# Patient Record
Sex: MALE | Race: WHITE | Employment: FULL TIME | URBAN - METROPOLITAN AREA
[De-identification: names, ages, dates, MRNs, and addresses within clinical notes are randomized per-mention and may not be internally consistent; named-entity substitution may affect disease eponyms.]

---

## 2019-12-18 ENCOUNTER — HOSPITAL ENCOUNTER (OUTPATIENT)
Dept: PHYSICAL THERAPY | Age: 59
Discharge: HOME | End: 2019-12-18
Payer: COMMERCIAL

## 2019-12-18 PROCEDURE — 97162 PT EVAL MOD COMPLEX 30 MIN: CPT

## 2019-12-18 NOTE — THERAPY EVALUATION
Anaid Seymour  : 1960  Primary: [place] [facility]*  Secondary:  Therapy Center at [facility] JESSI SHOREA  [address] [facility], [place], [address] [place] [address],[address], [place] [address].  [phone]   [phone]        OUTPATIENT PHYSICAL THERAPY:Initial Assessment 2019   ICD-10: Treatment Diagnosis: Cervalgia (M54.2),Pain in right elbow (M25.521), Pain in Thoracic spine (M54.6)  Precautions/Allergies: none/Erythromycin and Lortab [hydrocodone-acetaminophen]   TREATMENT PLAN:  Effective Dates: 2019 TO 2020 (60 days). Frequency/Duration: 1 to 3 times a week for 60 Day(s) MEDICAL/REFERRING DIAGNOSIS:  pt neck and back pain, right arm pain  DATE OF ONSET:   REFERRING PHYSICIAN: Darryle Duhamel.MD BAEZA Orders: Evaluate and treat. Neck and right arm pain. Traction if patient requests  Return MD Appointment: unsure     INITIAL ASSESSMENT:  Mr. Hali Haddad comes to therapy with complaints of mid back, neck and right arm pain that becomes severe with \"random movements\". He denies headache, numbness or tingling. No x-rays. Did have an injection in the right mid trap region for muscle spasm without relief. He presents with tearful affect due to amount of pain. Sits in tripod position occasionally using his hand to hold up his head or in a reclined position with the right hand behind his head for pain relief. All Right arm movements are guarded. Head and trunk movements rigid and minimal. Unable to fully assess RUE or cervical AROM. Unable to fully assess strength due to pain with testing. Unclear if he had a positive R quadrant testing due to cramping in the shoulder region. He needs to be able to lift, push and pull for work at [employer]. He will benefit from skilled therapy to address his deficits and assist in the return of functional ADL's with less pain. PROBLEM LIST (Impacting functional limitations):  1. Decreased Strength  2.  Decreased ADL/Functional Activities  3. Increased Pain  4. Decreased Activity Tolerance  5. Increased Fatigue INTERVENTIONS PLANNED: (Treatment may consist of any combination of the following)  1. Cold  2. Electrical Stimulation  3. Heat  4. Home Exercise Program (HEP)  5. Manual Therapy  6. Neuromuscular Re-education/Strengthening  7. Range of Motion (ROM)  8. Therapeutic Exercise/Strengthening   9. Mechanical traction     GOALS: (Goals have been discussed and agreed upon with patient.)  Short-Term Functional Goals: Time Frame: 2 weeks     1. Independent with HEP     2. Neck and R shoulder AROM WNL to allow ADL's without compensation  3. Pt demonstrate good posture  4. Pt to demonstrate pain relieving position  Discharge Goals: Time Frame: 60 days     1. Tolerate return to full work activity > 45 minutes      2. ADL's with pain < 3/10  OUTCOME MEASURE:   Tool Used: Neck Disability Index (NDI)  Score:  Initial: 19/50  Most Recent: X/50 (Date: -- )   Interpretation of Score: The Neck Disability Index is a revised form of the Oswestry Low Back Pain Index and is designed to measure the activities of daily living in person's with neck pain. Each section is scored on a 0-5 scale, 5 representing the greatest disability. The scores of each section are added together for a total score of 50. MEDICAL NECESSITY:   · Skilled intervention continues to be required due to need for manual treatment, modalities for pain and guided prgression into exercises. REASON FOR SERVICES/OTHER COMMENTS:  · Patient continues to require skilled intervention due to need to return to full work duty. Total Duration:  PT Patient Time In/Time Out  Time In: 1345  Time Out: 1445    Rehabilitation Potential For Stated Goals: Good  Regarding Danny Parada's therapy, I certify that the treatment plan above will be carried out by a therapist or under their direction.   Thank you for this referral,  Wood Etienne, PT     Referring Physician Signature: Jeffry Matson Octaviano Cyr MD No Signature is Required for this note. PAIN/SUBJECTIVE:   Initial:   10/10 Post Session:  1 to 5/10   HISTORY:   History of Injury/Illness (Reason for Referral):  Pt reports increasing right forearm pain while weighing turkeys before Thanks giving. Pain increased as he tried to stay active at work that week. Pain has gradually become so severe in the right arm, right shoulder, neck and mid back that has not been able to sleep, drive or work. Had an injection in the mid back muscle with only minimal short term relief. Denies numbness or tingling but has difficulty using right fingers. Has tried a home traction unit without relief. Past Medical History/Comorbidities:   Mr. Saravanan Blevins  has a past medical history of Arthritis, testicular Cancer, neck pain 2011  Mr. Saravanan Blevins  has a past surgical history that includes hx orchiectomy. Social History/Living Environment:     lives with his wife who is a RN. 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 Works at [employer] having to [employer]  Previous Treatment Approaches:          PT for neck 2009 and 2011   Ambulatory/Rehab Services H2 Model Falls Risk Assessment   Risk Factors:       (1)  Gender [Male] Ability to Rise from Chair:       (0)  Ability to rise in a single movement   Falls Prevention Plan:       No modifications necessary   Total: (5 or greater = High Risk): 1   ©2010 [facility] [place] [address] [place] [address] Patent #[id number]. Federal Law prohibits the replication, distribution or use without written permission from [facility] of [employer]   Current Medications:     Tramadol  PRN Advil or tylenol    Date Last Reviewed:  12/18/19   Number of Personal Factors/Comorbidities that affect the Plan of Care: 1-2: MODERATE COMPLEXITY   EXAMINATION:   Observation/Orthostatic Postural Assessment:          Pt grimacing with all neck and UE movement.  Tripod position with forward head and rounded shoulders with rigid neck and trunk. Pt using his hands to hold his head. Tearful about the pain  Palpation:          Guarded movements during attempts to move the right arm or turn his head. Generalized tenderness over the right mid and lower trap muscles, right levator scapularis, C5-T2  ROM: cervical AROM~ 25% due to pain. RUE WNL except:                      RUE AROM  R Shoulder Horizontal ABduction: (to neutral per pt preference due to pain in mid back)  R Shoulder Internal Rotation: (to L4)                 Strength: RUE 5/5 except:       RUE Strength  R Shoulder Flexion: 4  R Shoulder Extension: 4  R Shoulder ABduction: 4-  R Shoulder Horizontal ABduction: 4-  R Shoulder Internal Rotation: 4(subscap 3+)  R Shoulder External Rotation: 4              Special Tests:          Positive Right quadrant   Body Structures Involved:  1. Nerves  2. Joints  3. Muscles  4. Ligaments Body Functions Affected:  1. Sensory/Pain  2. Neuromusculoskeletal  3. Movement Related Activities and Participation Affected:  1. General Tasks and Demands  2. Self Care  3.  Domestic Life  4. job duties   Number of elements (examined above) that affect the Plan of Care: 3: MODERATE COMPLEXITY   CLINICAL PRESENTATION:   Presentation: Evolving clinical presentation with changing clinical characteristics: MODERATE COMPLEXITY   CLINICAL DECISION MAKING:   Use of outcome tool(s) and clinical judgement create a POC that gives a: Questionable prediction of patient's progress: MODERATE COMPLEXITY

## 2019-12-18 NOTE — PROGRESS NOTES
Eulalio Matthews  : 1960  Payor: Helioaide Estrada / [place] OF [address] [place] [address] Rd / Product Type: PPO /  Therapy Center at [address] [place] [address] Rd  [address] [facility], [address], [place] [address].  [phone]   [phone]       OUTPATIENT PHYSICAL THERAPY: Daily Treatment Note 2019  Visit Count: 1     ICD-10: Treatment Diagnosis:  Jonah Persons (M54.2),Pain in right elbow (M25.521), Pain in Thoracic spine (M54.6)    Precautions/Allergies: none/Erythromycin and Lortab [hydrocodone-acetaminophen]   TREATMENT PLAN:  Effective Dates: 2019 TO 2020 (60 days). Frequency/Duration: 1 to 3 times a week for 60 Day(s) MEDICAL/REFERRING DIAGNOSIS:  pt neck, right shoulder and mid back pain  DATE OF ONSET:   REFERRING PHYSICIAN: Sravan Schmidt MD MD Orders: Evaluate and treat. Neck and right arm pain. Traction if patient requests  Return MD Appointment: unsure       Pre-treatment Symptoms/Complaints:  Pt complains of constant ache pain that becomes severe in the right forearm/elbow region, neck and right mid back with \"just random\" positions    Pain: Initial:   10/10 Post Session:  -5/10   Medications Last Reviewed:  19    Updated Objective Findings:   Observation: Pt grimacing with all neck and UE movement. Tripod position with forward head and rounded shoulders with rigid neck and trunk. Pt using his hands to hold his head. Tearful about the pain       TREATMENT:   Therapeutic Exercise: (5 Minutes):  Exercises  to improve mobility and posture. Required maximal verbal, manual and demonstration cues to promote proper body alignment, promote proper body posture, promote proper body breathing techniques and technique. Home program with gentle chin tuck with scapular retraction, B shoulder ER, hooklying LTR.     Manual treatment: ( 5 minutes) manual cervical traction with PROM B rotation and left side bend; upper cervical flexion  Treatment/Session Summary: · Response to Treatment:  Good pain relief with manual treatment  · Communication/Consultation:  None today  · Equipment provided today:  None today  · Recommendations/Intent for next treatment session: Next visit will focus on manual treatment, pain modalities, traction if appropriate. Review HEP    Total Treatment Billable Duration:  10 minutes with evaluation  PT Patient Time In/Time Out  Time In: 1345  Time Out: [address], PT    No future appointments.

## 2020-01-02 ENCOUNTER — HOSPITAL ENCOUNTER (OUTPATIENT)
Dept: PHYSICAL THERAPY | Age: 60
Discharge: HOME | End: 2020-01-02
Payer: COMMERCIAL

## 2020-01-02 PROCEDURE — 97140 MANUAL THERAPY 1/> REGIONS: CPT

## 2020-01-02 NOTE — PROGRESS NOTES
Gloris Goldberg  : 1960  Payor: Андрей Loges / [place] [address] [place] [address] Rd / Product Type: PPO /  Therapy Center at [facility] JESSI TURNER  1101 [facility], [address], [address] [facility]  [phone]   [phone]       OUTPATIENT PHYSICAL THERAPY: Daily Treatment Note 2020  Visit Count: 2     ICD-10: Treatment Diagnosis:  Tai Carvajal (M54.2),Pain in right elbow (M25.521), Pain in Thoracic spine (M54.6)    Precautions/Allergies: none/Erythromycin and Lortab [hydrocodone-acetaminophen]   TREATMENT PLAN:  Effective Dates: 2019 TO 2020 (60 days). Frequency/Duration: 1 to 3 times a week for 60 Day(s) MEDICAL/REFERRING DIAGNOSIS:  pt neck, right shoulder and mid back pain  DATE OF ONSET:   REFERRING PHYSICIAN: Arnold Dangelo.MD BAEZA Orders: Evaluate and treat. Neck and right arm pain. Traction if patient requests  Return MD Appointment: unsure       Pre-treatment Symptoms/Complaints:  Pt reports he did okay the day of his evaluation, but has been sore ever since. The doctor who ordered PT is a friend of his who is out of town until next week, but patient may contact him next week to see about an MRI. Notes that he has a Guido traction unit at home, but it hasn't seemed to help. Reports \"numbness\" in R 4th and 5th fingers. Pain: Initial: Pain Intensity 1: 3   Post Session:  \"Better\"   Medications Last Reviewed:  20    Updated Objective Findings:   Notes pain with ulnar nerve glides. TREATMENT:   Therapeutic Exercise: 2 minutes  ( ):Instructed patient in standing with back to door for periodic posture checks, and instructed patient in performance of scapular retraction. .    Manual treatment: ( 25 minutes) manual cervical traction x 15 minutes with patient supine on table. 10 Minutes STM to cervical paraspinals and R upper trap. L cervical sidebend with depression of R shoulder, in supine. Gentle nerve glides.    Treatment/Session Summary: · Response to Treatment:  Good pain relief with treatment today. He returns to PT tomorrow and is to report how long the relief lasted. · Communication/Consultation:  None today  · Equipment provided today:  None today  · Recommendations/Intent for next treatment session: Next visit will focus on manual treatment, and pain modalities including traction.      Total Treatment Billable Duration:  27 minutes    PT Patient Time In/Time Out  Time In: 1432  Time Out: [address] Cramer

## 2020-01-03 ENCOUNTER — HOSPITAL ENCOUNTER (OUTPATIENT)
Dept: PHYSICAL THERAPY | Age: 60
Discharge: HOME | End: 2020-01-03
Payer: COMMERCIAL

## 2020-01-03 PROCEDURE — 97140 MANUAL THERAPY 1/> REGIONS: CPT

## 2020-01-03 NOTE — PROGRESS NOTES
Ulisses Christopher  : 1960  Payor: Julius Dietz / [place] [address] [place] [address] Rd / Product Type: PPO /  Therapy Center at [facility] JESSI TURNER  [address] [place] [address], [address], [address] [place] [address]  [phone]   [phone]       OUTPATIENT PHYSICAL THERAPY: Daily Treatment Note 1/3/2020  Visit Count: 3     ICD-10: Treatment Diagnosis:  Cervalgia (M54.2),Pain in right elbow (M25.521), Pain in Thoracic spine (M54.6)    Precautions/Allergies: none/Erythromycin and Lortab [hydrocodone-acetaminophen]   TREATMENT PLAN:  Effective Dates: 2019 TO 2020 (60 days). Frequency/Duration: 1 to 3 times a week for 60 Day(s) MEDICAL/REFERRING DIAGNOSIS:  pt neck, right shoulder and mid back pain  DATE OF ONSET:   REFERRING PHYSICIAN: Juan Brunner., MD BAEZA Orders: Evaluate and treat. Neck and right arm pain. Traction if patient requests  Return MD Appointment: unsure       Pre-treatment Symptoms/Complaints:  Pt reports severe headache; pain in the right side of his mid back that he can't get relief from. Numbness in the right hand and 4th - 5th fingers. Pain: Initial: 8/10 and right shoulder, mid back and 6-7/10 headache     Post Session: 1-2 in right arm, 0 in neck. 3/10 headache    Medications Last Reviewed:  20    Updated Objective Findings:   Observation:Pt sitting with eyes closed. Posture with rounded shoulders. Right scapular protraction and arm drawn into his body. Pt anxious about pain   Palpation: tender over the right subscap with poor muscle contraction; right temporal and subocc regions, left rotated C6     TREATMENT:   Therapeutic Exercise:  ( ):  Manual treatment: ( 39 minutes) manual cervical traction with left lateral flexion stretch, upper cervical flexion stretch, suboccipital release. R upper limb nerve glides for ulnar and median nerves with stabilization of the 1st and 2nd ribs. Right subscapularis release with stretching.   MFR to right temporalis muscle   Treatment/Session Summary:    · Response to Treatment:  No numbness in the right hand or fingers after treatment. Significant subscap spasm released with improved muscle contraction after. Pt needs to work on posture correction and relaxation  · Communication/Consultation:  None today  · Equipment provided today:  None today  · Recommendations/Intent for next treatment session: Next visit will focus on manual treatment, and pain modalities including traction.      Total Treatment Billable Duration:  39 minutes    PT Patient Time In/Time Out  Time In: 1115  Time Out: [address], PT

## 2020-01-07 ENCOUNTER — HOSPITAL ENCOUNTER (OUTPATIENT)
Dept: PHYSICAL THERAPY | Age: 60
Discharge: HOME | End: 2020-01-07
Payer: COMMERCIAL

## 2020-01-07 PROCEDURE — 97110 THERAPEUTIC EXERCISES: CPT

## 2020-01-07 PROCEDURE — 97140 MANUAL THERAPY 1/> REGIONS: CPT

## 2020-01-07 NOTE — PROGRESS NOTES
Gloris Goldberg  : 1960  Payor: Bel Hoffman  / [place] OF [address] / Product Type: PPO /  Therapy Center at [facility] JESSI TURNER  [address] [place] [address], [address], [address] [place] [address]  [phone]   [phone]       OUTPATIENT PHYSICAL THERAPY: Daily Treatment Note 2020  Visit Count: 4     ICD-10: Treatment Diagnosis:  Cervalgia (M54.2),Pain in right elbow (M25.521), Pain in Thoracic spine (M54.6)    Precautions/Allergies: none/Erythromycin and Lortab [hydrocodone-acetaminophen]   TREATMENT PLAN:  Effective Dates: 2019 TO 2020 (60 days). Frequency/Duration: 1 to 3 times a week for 60 Day(s) MEDICAL/REFERRING DIAGNOSIS:  pt neck, right shoulder and mid back pain  DATE OF ONSET:   REFERRING PHYSICIAN: Arnold Booth, MD BAEZA Orders: Evaluate and treat. Neck and right arm pain. Traction if patient requests  Return MD Appointment: unsure       Pre-treatment Symptoms/Complaints:  Pt reports feeling so much better. The right arm doesn't hurt nearly as much and no pain in his right side. Felt great until sleeping in his usual fetal position. no headache. Decreased tingling. Doing traction at home with no change. Rather have our same treatment    Numbness in t. Pain: Initial: 2/10 and right shoulder, 3/10 mid back and lower neck. no headache     Post Session: 1-2 in right arm, 0 in neck. Medications Last Reviewed:  20    Updated Objective Findings:   Observation:Pt with more erect posture and fluent movements. Brighter affect   Palpation: normal right subscap muscle contraction; tender over right C5-T1, left rotated C6     TREATMENT:   Therapeutic Exercise:  (30 Minutes): for strengthening and posture.  Reviewed and issued upgraded HEP with standing shoulder ER, shoulder extn, R upper limb median nerve glides, IR/belly press and posture correction\"snow rl\" against the wall  Manual treatment: ( 10 minutes) supine manual cervical traction with upper cervical flexion stretch for ROM. Prone Grade 3 to 4- right unilateral PA glides at C6, grade 4- to 4 translation glides at T1 -T3  Treatment/Session Summary:    · Response to Treatment. Good return demonstration of exericses. Pt needs to continue work on posture correction with strengthening  · Communication/Consultation:  None today  · Equipment provided today:  None today  · Recommendations/Intent for next treatment session: Next visit will focus on manual treatment, and pain modalities and progressive strengthening.  no traction per pt request.     Total Treatment Billable Duration:  40 minutes    PT Patient Time In/Time Out  Time In: 7050  Time Out: [address], PT

## 2020-01-10 ENCOUNTER — HOSPITAL ENCOUNTER (OUTPATIENT)
Dept: PHYSICAL THERAPY | Age: 60
Discharge: HOME | End: 2020-01-10
Payer: COMMERCIAL

## 2020-01-10 PROCEDURE — 97110 THERAPEUTIC EXERCISES: CPT

## 2020-01-10 PROCEDURE — 97140 MANUAL THERAPY 1/> REGIONS: CPT

## 2020-01-10 PROCEDURE — 97012 MECHANICAL TRACTION THERAPY: CPT

## 2020-01-10 NOTE — PROGRESS NOTES
Kate Martinez  : 1960  Payor: Linda Acharya / Plan: [facility] CROSS OF [address] [place] [address] Rd / Product Type: PPO /  Therapy Center at [facility] JESSI TURNER  [address] [facility], [address], [place] [address].  [phone]   [phone]       OUTPATIENT PHYSICAL THERAPY: Daily Treatment Note 1/10/2020  Visit Count: 5     ICD-10: Treatment Diagnosis:  Cervalgia (M54.2),Pain in right elbow (M25.521), Pain in Thoracic spine (M54.6)    Precautions/Allergies: none/Erythromycin and Lortab [hydrocodone-acetaminophen]   TREATMENT PLAN:  Effective Dates: 2019 TO 2020 (60 days). Frequency/Duration: 1 to 3 times a week for 60 Day(s) MEDICAL/REFERRING DIAGNOSIS:  pt neck, right shoulder and mid back pain  DATE OF ONSET:   REFERRING PHYSICIAN: Agnieszka Jaramillo MD MD Orders: Evaluate and treat. Neck and right arm pain. Traction if patient requests  Return MD Appointment: unsure       Pre-treatment Symptoms/Complaints:  Pt reports feeling sore after the last session and has only done the new HEP 1 time. Been working with less pain and  Didn't start hurting until the last 30 minutes of the shift. Numbness in the right hand is only minimal.  Trouble gripping with the right hand   Pain: Initial: 0/10 and right shoulder, 2/10 mid back. no headache     Post Session: 1-2 in right arm, 0 in neck. Medications Last Reviewed:  20    Updated Objective Findings:   Observation: no distress   Palpation: tender at  right subscap; tender over right T5     TREATMENT:   Therapeutic Exercise:  (25 Minutes): for strengthening and posture. AIS manual upper cervical flexion followed by chin tucks.  Exercises per grid for mid back and scapulothoracic strengthening    Date:  1/10/20 Date:   Date:     Activity/Exercise Parameters Parameters Parameters   midtrap Prone x10 x8     Low trap Prone B x8     Serratus punch B 3# x6 x8     ER B red 2x8                       Modalities: (15 minutes) static mechanical cervical traction at 28# for lower C spine to reduce arm symptoms    Manual treatment: ( 10 minutes) Prone Grade 3 to 4- right unilateral PA glides at T1, grade 4- to 4 translation glides at T1 -T5  Treatment/Session Summary:    · Response to Treatment. Good return demonstration of exericses. Pt needs to continue work on posture correction with strengthening  · Communication/Consultation:  None today  · Equipment provided today:  None today  · Recommendations/Intent for next treatment session: Next visit will focus on manual treatment, and pain modalities and progressive strengthening.       Total Treatment Billable Duration:  50 minutes    PT Patient Time In/Time Out  Time In: 1230  Time Out: [address]addad, PT

## 2020-01-14 ENCOUNTER — HOSPITAL ENCOUNTER (OUTPATIENT)
Dept: PHYSICAL THERAPY | Age: 60
Discharge: HOME | End: 2020-01-14
Payer: COMMERCIAL

## 2020-01-14 PROCEDURE — 97012 MECHANICAL TRACTION THERAPY: CPT

## 2020-01-14 PROCEDURE — 97140 MANUAL THERAPY 1/> REGIONS: CPT

## 2020-01-14 PROCEDURE — 97110 THERAPEUTIC EXERCISES: CPT

## 2020-01-14 NOTE — PROGRESS NOTES
Domi Oneal  : 1960  Payor: Ryan Fuentes / [place] OF [address] [place] [address] Rd / Product Type: PPO /  Therapy Center at [facility] JESSI TURNER  1101 [facility], [address], [address] [place] [address]  [phone]   [phone]       OUTPATIENT PHYSICAL THERAPY: Daily Treatment Note 2020  Visit Count: 6     ICD-10: Treatment Diagnosis:  Cervalgia (M54.2),Pain in right elbow (M25.521), Pain in Thoracic spine (M54.6)    Precautions/Allergies: none/Erythromycin and Lortab [hydrocodone-acetaminophen]   TREATMENT PLAN:  Effective Dates: 2019 TO 2020 (60 days). Frequency/Duration: 1 to 3 times a week for 60 Day(s) MEDICAL/REFERRING DIAGNOSIS:  pt neck, right shoulder and mid back pain  DATE OF ONSET:   REFERRING PHYSICIAN: Charan Rosen, MD BAEZA Orders: Evaluate and treat. Neck and right arm pain. Traction if patient requests  Return MD Appointment: unsure       Pre-treatment Symptoms/Complaints:  Pt reports his neck hasn't been bad until he sits in his car. Traction made me sore but helped the arm pain  Pain: Initial: 0/10 neck, 1-2 right forearm , 1-2/10 mid back     Post Session: 0-1 in right arm, 0 in neck. Medications Last Reviewed:  20  Antibiotic cream    Updated Objective Findings:   Observation: car seat too straight erect, far forward with head rest too far forward   Palpation: tender over right T 1 and T4 region     TREATMENT:   Therapeutic Exercise:  (10 Minutes): reviewed HEP with instructions to increase reps by 10. Seated trunk extn stretch with pt performing chin tuck/scapular retraction Instructed pt to adjust car seat farther back away from dash, reclined with pillow behind back to allow head rectraction back.        Date:  1/10/20 Date:   Date:     Activity/Exercise Parameters Parameters Parameters   midtrap Prone x10 x8     Low trap Prone B x8     Serratus punch B 3# x6 x8     ER B red 2x8                       Modalities: (15 minutes) intermittent mech traction with 28# pull 30 sec/12# rest for 10 sec to relieve arm symptoms  Manual treatment: ( 15 minutes) Grade 3 to 4- right T2-5 unilateral PA glides. R scalene release with stretch    Treatment/Session Summary:    · Response to Treatment. Good return demonstration of exericses. Better posture in car with adjustments. Good relief of arm symptoms. · Communication/Consultation:  None today  · Equipment provided today:  None today  · Recommendations/Intent for next treatment session: Next visit will focus on manual treatment, and pain modalities and progressive strengthening.   Issue red band for HEP    Total Treatment Billable Duration:  40 minutes    PT Patient Time In/Time Out  Time In: 1315  Time Out: [address], PT

## 2020-01-17 ENCOUNTER — HOSPITAL ENCOUNTER (OUTPATIENT)
Dept: PHYSICAL THERAPY | Age: 60
Discharge: HOME | End: 2020-01-17
Payer: COMMERCIAL

## 2020-01-17 PROCEDURE — 97110 THERAPEUTIC EXERCISES: CPT

## 2020-01-17 PROCEDURE — 97012 MECHANICAL TRACTION THERAPY: CPT

## 2020-01-17 NOTE — PROGRESS NOTES
Diana Venegas  : 1960  Payor: Marisol Dos Santos / [place]: [place] OF [address] [place] [address] Rd / Product Type: PPO /  Therapy Center at [facility] JESSI TURNER  [address] [place] [address], [address], [place] [address].  [phone]   [phone]       OUTPATIENT PHYSICAL THERAPY: Daily Treatment Note 2020  Visit Count: 7     ICD-10: Treatment Diagnosis:  Cervalgia (M54.2),Pain in right elbow (M25.521), Pain in Thoracic spine (M54.6)    Precautions/Allergies: none/Erythromycin and Lortab [hydrocodone-acetaminophen]   TREATMENT PLAN:  Effective Dates: 2019 TO 2020 (60 days). Frequency/Duration: 1 to 3 times a week for 60 Day(s) MEDICAL/REFERRING DIAGNOSIS:  pt neck, right shoulder and mid back pain  DATE OF ONSET:   REFERRING PHYSICIAN: Wanda Harman MD MD Orders: Evaluate and treat. Neck and right arm pain. Traction if patient requests  Return MD Appointment: unsure       Pre-treatment Symptoms/Complaints:  Pt reports the shoulders being sore from working yesterday. No numbness or tingling in the arms, only some soreness. adjusting the car seat helped  Pain: Initial: 0/10 neck, 1-2 in the shoulders and arms     Post Session: 0-1 in right arm, 0 in neck. Medications Last Reviewed:  20  Antibiotic cream    Updated Objective Findings:   Observation: good posture correction  Palpation: n/t     TREATMENT:   Therapeutic Exercise:  (28 Minutes): exercises per grid for B shoulder, scapulo- thoracic strength with emphasis on core activation and corrected posture. Issued red tband.      Date:  1/10/20 Date:  20 Date:     Activity/Exercise Parameters Parameters Parameters   midtrap Prone x10 x8 Propped 1# 2x10 B    Low trap Prone B x8 Propped 1# x10 x8    Serratus punch B 3# x6 x8 B 3# 2x10    ER B red 2x8 B red x10 x8    IR  B red x10 x8                Modalities: (15 minutes) intermittent mech traction with 28# pull 30 sec/12# rest for 10 sec to relieve arm symptoms  Manual treatment: ( 2 minutes) Grade  4- left and right T3-4 unilateral PA glides. Treatment/Session Summary:    · Response to Treatment. Good return demonstration of exericses. Better posture and good response to traction. · Communication/Consultation:  None today  · Equipment provided today:  None today  · Recommendations/Intent for next treatment session: Next visit will focus on manual treatment, and pain modalities and progressive strengthening.      Total Treatment Billable Duration:  45 minutes    PT Patient Time In/Time Out  Time In: 1220  Time Out: [address], PT

## 2020-01-21 ENCOUNTER — HOSPITAL ENCOUNTER (OUTPATIENT)
Dept: PHYSICAL THERAPY | Age: 60
Discharge: HOME | End: 2020-01-21
Payer: COMMERCIAL

## 2020-01-21 PROCEDURE — 97012 MECHANICAL TRACTION THERAPY: CPT

## 2020-01-21 PROCEDURE — 97110 THERAPEUTIC EXERCISES: CPT

## 2020-01-21 NOTE — PROGRESS NOTES
Kevin Kang  : 1960  Payor: Claudeen Lao / [place] [address] [place] [address] Rd / Product Type: PPO /  Therapy Center at [facility] JESSI TURNER  [address] [place] [address], [address], [place] [address].  [phone]   [phone]       OUTPATIENT PHYSICAL THERAPY: Daily Treatment Note 2020  Visit Count: 8     ICD-10: Treatment Diagnosis:  Cervalgia (M54.2),Pain in right elbow (M25.521), Pain in Thoracic spine (M54.6)    Precautions/Allergies: none/Erythromycin and Lortab [hydrocodone-acetaminophen]   TREATMENT PLAN:  Effective Dates: 2019 TO 2020 (60 days). Frequency/Duration: 1 to 3 times a week for 60 Day(s) MEDICAL/REFERRING DIAGNOSIS:  pt neck, right shoulder and mid back pain  DATE OF ONSET:   REFERRING PHYSICIAN: Waqas Marcos MD MD Orders: Evaluate and treat. Neck and right arm pain. Traction if patient requests  Return MD Appointment: unsure       Pre-treatment Symptoms/Complaints:  Pt reports the shoulders being sore from getting a shingles shot. No real numbness or tingling in the arm  Pain: Initial: 0/10 neck, muscle soreness in the shoulders and arms     Post Session: no worse in right arm, 0 in neck. Medications Last Reviewed:  20  Antibiotic cream    Updated Objective Findings:   Observation: good posture correction  Palpation: tender over the shot region of the left arm     TREATMENT:   Therapeutic Exercise:  (29 Minutes): exercises per grid for B shoulder, scapulo- thoracic strength with emphasis on core activation and corrected posture. AIS left shoulder ER,IR, horizontal abd and flexion.      Date:  1/10/20 Date:  20 Date:  20   Activity/Exercise Parameters Parameters Parameters   midtrap Prone x10 x8 Propped 1# 2x10 B 1# x12 x10   Low trap Prone B x8 Propped 1# x10 x8 1# 2x10   Serratus punch B 3# x6 x8 B 3# 2x10 B 3# cable 2x12   ER B red 2x8 B red x10 x8 B red 2x10   IR  B red x10 x8 B red 2x10               Modalities: (15 minutes) intermittent mech traction with 29# pull 30 sec/12# rest for 10 sec to relieve neck soreness and reduce arm soreness  Manual treatment: (  minutes)     Treatment/Session Summary:    · Response to Treatment. Good return demonstration of exercises with increased reps. Better posture. Anticipate discharge soon  · Communication/Consultation:  None today  · Equipment provided today:  None today  · Recommendations/Intent for next treatment session: Next visit will focus on manual treatment, and pain modalities and progressive strengthening.      Total Treatment Billable Duration:  44 minutes    PT Patient Time In/Time Out  Time In: 4555  Time Out: Daniel 62, PT

## 2020-01-24 ENCOUNTER — HOSPITAL ENCOUNTER (OUTPATIENT)
Dept: PHYSICAL THERAPY | Age: 60
Discharge: HOME | End: 2020-01-24
Payer: COMMERCIAL

## 2020-01-24 PROCEDURE — 97110 THERAPEUTIC EXERCISES: CPT

## 2020-01-24 NOTE — PROGRESS NOTES
Lucio Mg  : 1960  Payor: Britney Felix / [place] [address] [facility] [address] / Product Type: PPO /  Therapy Center at [facility] JESSI TURNER  [address] [place] [address], [address], [place] [address].  [phone]   [phone]       OUTPATIENT PHYSICAL THERAPY: Daily Treatment Note 2020  Visit Count: 9     ICD-10: Treatment Diagnosis:  Cervalgia (M54.2),Pain in right elbow (M25.521), Pain in Thoracic spine (M54.6)    Precautions/Allergies: none/Erythromycin and Lortab [hydrocodone-acetaminophen]   TREATMENT PLAN:  Effective Dates: 2019 TO 2020 (60 days). Frequency/Duration: 1 to 3 times a week for 60 Day(s) MEDICAL/REFERRING DIAGNOSIS:  pt neck, right shoulder and mid back pain  DATE OF ONSET:   REFERRING PHYSICIAN: Harley Teague MD MD Orders: Evaluate and treat. Neck and right arm pain. Traction if patient requests  Return MD Appointment: unsure       Pre-treatment Symptoms/Complaints:  Pt reports the shoulders only feel sore . No numbness or tingling in the arm  Pain: Initial: 0/10 neck and upper back. Sore in shoulders     Post Session: 0/10,     Medications Last Reviewed:  20  Updated Objective Findings:   Observation: good posture correction  Palpation: tender over the shot region of the left arm     TREATMENT:   Therapeutic Exercise:  (41 Minutes): AIS upper cervical flexion/ lower cervical extn; exercises per grid for B shoulder, scapulo- thoracic strength with emphasis on core activation and corrected posture. AIS left shoulder ER,IR, horizontal abd and flexion.      Date:  1/10/20 Date:  20 Date:  20   Activity/Exercise Parameters Parameters Parameters    midtrap Prone x10 x8 Propped 1# 2x10 B 1# x12 x10 1# 2x12   Low trap Prone B x8 Propped 1# x10 x8 1# 2x10 1# 2x12   Serratus punch B 3# x6 x8 B 3# 2x10 B 3# cable 2x12 B 3# 2x15   ER B red 2x8 B red x10 x8 B red 2x10 B red 2x12   IR  B red x10 x8 B red 2x10 B red 2x12 Modalities: ( minutes) none  Manual treatment: (  minutes)     Treatment/Session Summary:    · Response to Treatment. Good return demonstration of exercises with increased reps. · Communication/Consultation:  None today  · Equipment provided today:  None today  · Recommendations/Intent for next treatment session: Next visit will focus on manual treatment, and pain modalities and progressive strengthening.  Decrease to 1x week    Total Treatment Billable Duration:  41 minutes    PT Patient Time In/Time Out  Time In: 1300  Time Out: Lynda 25, PT

## 2020-01-27 ENCOUNTER — APPOINTMENT (OUTPATIENT)
Dept: PHYSICAL THERAPY | Age: 60
End: 2020-01-27
Payer: COMMERCIAL

## 2020-01-29 ENCOUNTER — HOSPITAL ENCOUNTER (OUTPATIENT)
Dept: PHYSICAL THERAPY | Age: 60
Discharge: HOME | End: 2020-01-29
Payer: COMMERCIAL

## 2020-01-29 PROCEDURE — 97110 THERAPEUTIC EXERCISES: CPT

## 2020-01-29 NOTE — PROGRESS NOTES
Ange Suárez  : 1960  Primary: [facility]  Secondary:  Therapy Center at [facility] JESSI TURNER  [address] [facility], [address] [place] [address],[address], [place] [address].  [phone]   [phone]        OUTPATIENT PHYSICAL THERAPY:Progress Report 2020   ICD-10: Treatment Diagnosis: Cervalgia (M54.2),Pain in right elbow (M25.521), Pain in Thoracic spine (M54.6)  Precautions/Allergies: none/Erythromycin and Lortab [hydrocodone-acetaminophen]   TREATMENT PLAN:  Effective Dates: 2019 TO 2020 (60 days). Frequency/Duration: 1 to 3 times a week for 60 Day(s) MEDICAL/REFERRING DIAGNOSIS:  pt neck and back pain, right arm pain  DATE OF ONSET: mid November  REFERRING PHYSICIAN: Lili Pastor.MD BAEZA Orders: Evaluate and treat. Neck and right arm pain. Traction if patient requests  Return MD Appointment: unsure      ASSESSMENT:  Mr. Whit Dimas came to therapy with complaints of mid back, neck and right arm pain that became severe. He presents with only minimal activity specific pain that is controlled with over the counter medication or rest. Significant improvement in overall posture. RUE AROM is WFL although it remains tight with flexion and compensatory shrugging that contributes to neck pain. Strength has improved but remains limited functionally. Negative R quadrant testing. Goals are being addressed and partially met. He has returned to work with limited lifting but needs to be able to lift, push and pull boxes for work at [employer]. He will benefit from continued skilled therapy to address his deficits and assist in the return of functional ADL's with less pain. PROBLEM LIST (Impacting functional limitations):  1. Decreased Strength  2. Decreased ADL/Functional Activities  3. Increased Pain  4. Decreased Activity Tolerance  5. Increased Fatigue INTERVENTIONS PLANNED: (Treatment may consist of any combination of the following)  1. Cold  2.  Electrical Stimulation  3. Heat  4. Home Exercise Program (HEP)  5. Manual Therapy  6. Neuromuscular Re-education/Strengthening  7. Range of Motion (ROM)  8. Therapeutic Exercise/Strengthening   9. Mechanical traction     GOALS: (Goals have been discussed and agreed upon with patient.)  Short-Term Functional Goals: Time Frame: 2 weeks     1. Independent with HEP- being met     2. Neck and R shoulder AROM WNL to allow ADL's without compensation- not fully met in the shoulder  3. Pt demonstrate good posture- met with cueing  4. Pt to demonstrate pain relieving position-met  Discharge Goals: Time Frame: 60 days     1. Tolerate return to full work activity > 45 minutes - partially met     2. ADL's with pain < 3/10- met  OUTCOME MEASURE:   Tool Used: Neck Disability Index (NDI)  Score:  Initial: 19/50  Most Recent: 1/50 (Date: 1/29/20 )   Interpretation of Score: The Neck Disability Index is a revised form of the Oswestry Low Back Pain Index and is designed to measure the activities of daily living in person's with neck pain. Each section is scored on a 0-5 scale, 5 representing the greatest disability. The scores of each section are added together for a total score of 50. MEDICAL NECESSITY:   · Skilled intervention continues to be required due to need for manual treatment, modalities for pain and guided prgression into exercises. REASON FOR SERVICES/OTHER COMMENTS:  · Patient continues to require skilled intervention due to need to return to full work duty. Total Duration: 50 minutes  PT Patient Time In/Time Out  Time In: 1300  Time Out: 1350             PAIN/SUBJECTIVE:   Initial:   1-2/10 Post Session:  0-1/10   HISTORY:   History of Injury/Illness (Reason for Referral):  Pt reports increasing right forearm pain while weighing turkeys before Thanks giving. Pain increased as he tried to stay active at work that week.  Pain has gradually become so severe in the right arm, right shoulder, neck and mid back that has not been able to sleep, drive or work. Had an injection in the mid back muscle with only minimal short term relief. Denies numbness or tingling but has difficulty using right fingers. Has tried a home traction unit without relief. Past Medical History/Comorbidities:   Mr. Priya Contreras  has a past medical history of Arthritis, testicular Cancer, neck pain 2011  Mr. Priya Contreras  has a past surgical history that includes hx orchiectomy. Social History/Living Environment:     lives with his wife who is a RN. 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 Works at [employer] having to [employer]  Previous Treatment Approaches:          PT for neck 2009 and 2011      EXAMINATION:   Observation/Orthostatic Postural Assessment:          Pt with forward head and rounded shoulders posture that corrects with occasional cueing. fluent neck and trunk movements. Still with shrugging and use of biceps for reaching  Palpation:           Generalized tenderness over right levator scapularis,   ROM: cervical AROM WNL. RUE WNL except:                      RUE AROM  R Shoulder Flexion: 125(before shrugging)  R Shoulder Horizontal ABduction: 20                 Strength: RUE 5/5 except:       RUE Strength  R Shoulder Flexion: 4+  R Shoulder Extension: 5  R Shoulder ABduction: 4+  R Shoulder Horizontal ABduction: 4  R Shoulder Internal Rotation: 5(subscap 4+)  R Shoulder External Rotation: 5              Special Tests:          negative Right quadrant.  Mild upper limb nerve tension in Right shoulder after exercises

## 2020-01-29 NOTE — PROGRESS NOTES
Alea Butcher  : 1960  Payor: Vicky Samaniego / Plan: [facility] [address] OF [address] / Product Type: PPO /  Therapy Center at [facility] JESSI TURNER  [address] [facility], [address], [place] [address].  [phone]   [phone]       OUTPATIENT PHYSICAL THERAPY: Daily Treatment Note 2020  Visit Count: 10     ICD-10: Treatment Diagnosis:  Cervalgia (M54.2),Pain in right elbow (M25.521), Pain in Thoracic spine (M54.6)    Precautions/Allergies: none/Erythromycin and Lortab [hydrocodone-acetaminophen]   TREATMENT PLAN:  Effective Dates: 2019 TO 2020 (60 days). Frequency/Duration: 1 to 3 times a week for 60 Day(s) MEDICAL/REFERRING DIAGNOSIS:  pt neck, right shoulder and mid back pain  DATE OF ONSET:   REFERRING PHYSICIAN: Sukhi Gallego MD MD Orders: Evaluate and treat. Neck and right arm pain. Traction if patient requests  Return MD Appointment: unsure       Pre-treatment Symptoms/Complaints:  Pt reports having vertigo episode that lasted one night. Neck and shoulders only feel sore . No numbness or tingling. drove to/from [facility]  Pain: Initial: 0/10 neck and 1-2/10 upper back. Post Session: 0/10 neck, 0-1 in upper back,     Medications Last Reviewed:  20  Updated Objective Findings:   Observation: slouched posture with good correction  Palpation: tender over the right levator scapular muscle     TREATMENT:   Therapeutic Exercise:  (40 Minutes): AIS upper cervical flexion/ lower cervical extn followed by pt performing chin tucks once placed in neutral position. Upper trunk R rotational stretch. AIS left shoulder IR, horizontal abd and flexion prior to exercises. Exercises per grid for B shoulder, scapulo- thoracic strength with emphasis on core activation and corrected posture.       Date:  1/10/20 Date:  20 Date:  20   Activity/Exercise Parameters Parameters Parameters     midtrap Prone x10 x8 Propped 1# 2x10 B 1# x12 x10 1# 2x12 1# 2x15   Low trap Prone B x8 Propped 1# x10 x8 1# 2x10 1# 2x12 1# 2x15   Serratus punch B 3# x6 x8 B 3# 2x10 B 3# cable 2x12 B 3# 2x15 B 3# x25   ER B red 2x8 B red x10 x8 B red 2x10 B red 2x12 Red 2x12   IR  B red x10 x8 B red 2x10 B red 2x12 ---                   Modalities: ( minutes) none  Manual treatment: (  minutes) none    Treatment/Session Summary:    · Response to Treatment. Good return demonstration of exercises with posture correction. Recommend 1 x week for 1-2 weeks to monitor HEP compliance. · Communication/Consultation:  None today  · Equipment provided today:  None today  · Recommendations/Intent for next treatment session: Next visit will focus on manual treatment, and progressive strengthening.      Total Treatment Billable Duration:  40 minutes    PT Patient Time In/Time Out  Time In: 1300  Time Out: [address] [place], [place]

## 2020-02-05 ENCOUNTER — APPOINTMENT (OUTPATIENT)
Dept: PHYSICAL THERAPY | Age: 60
End: 2020-02-05

## 2020-02-12 ENCOUNTER — APPOINTMENT (OUTPATIENT)
Dept: PHYSICAL THERAPY | Age: 60
End: 2020-02-12

## 2020-03-17 NOTE — PROGRESS NOTES
Taj Cortes  : 1960  Primary: [facility]  Secondary:  Therapy Center at [facility] JESSI SHORE[address],  [place].  [phone]   [phone]        OUTPATIENT PHYSICAL THERAPY:Discontinuation Summary 2020   ICD-10: Treatment Diagnosis: Cervalgia (M54.2),Pain in right elbow (M25.521), Pain in Thoracic spine (M54.6)  Precautions/Allergies: none/Erythromycin and Lortab [hydrocodone-acetaminophen]    PLAN:   discontinued to HEP MEDICAL/REFERRING DIAGNOSIS:  pt neck and back pain, right arm pain  DATE OF ONSET: mid November  REFERRING PHYSICIAN: Nata Tay MD MD Orders: Evaluate and treat. Neck and right arm pain. Traction if patient requests  Return MD Appointment: unsure      ASSESSMENT:  Mr. Elle Marte came to therapy with complaints of mid back, neck and right arm pain that became severe. He presents with only minimal activity specific pain that is controlled with over the counter medication or rest. Significant improvement in overall posture. RUE AROM is WFL although it remains tight with flexion and compensatory shrugging that contributes to neck pain. Strength has improved but remains limited functionally. Negative R quadrant testing. Goals are being addressed and partially met. He has returned to work with limited lifting but needs to be able to lift, push and pull boxes for work at [employer]. He will be disconitued due to not returning to therapy              GOALS: as of progress report  Short-Term Functional Goals: Time Frame: 2 weeks     1. Independent with HEP- being met     2. Neck and R shoulder AROM WNL to allow ADL's without compensation- not fully met in the shoulder  3. Pt demonstrate good posture- met with cueing  4. Pt to demonstrate pain relieving position-met  Discharge Goals: Time Frame: 60 days     1. Tolerate return to full work activity > 45 minutes - partially met     2. ADL's with pain < 3/10- met  OUTCOME MEASURE:   Tool Used: Neck Disability Index (NDI)  Score:  Initial: 19/50  Most Recent: 1/50 (Date: 1/29/20 )   Interpretation of Score: The Neck Disability Index is a revised form of the Oswestry Low Back Pain Index and is designed to measure the activities of daily living in person's with neck pain. Each section is scored on a 0-5 scale, 5 representing the greatest disability. The scores of each section are added together for a total score of 50. PAIN/SUBJECTIVE:   Initial:   1-2/10 Post Session:  0-1/10   HISTORY:   History of Injury/Illness (Reason for Referral):  Pt reports increasing right forearm pain while weighing turkeys before Thanks giving. Pain increased as he tried to stay active at work that week. Pain has gradually become so severe in the right arm, right shoulder, neck and mid back that has not been able to sleep, drive or work. Had an injection in the mid back muscle with only minimal short term relief. Denies numbness or tingling but has difficulty using right fingers. Has tried a home traction unit without relief. Past Medical History/Comorbidities:   Mr. Fany Orellana  has a past medical history of Arthritis, testicular Cancer, neck pain 2011  Mr. Fany Orellana  has a past surgical history that includes hx orchiectomy. Social History/Living Environment:     lives with his wife who is a RN. 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 Works at [employer] having to [facility]  Previous Treatment Approaches:          PT for neck 2009 and 2011      EXAMINATION:   Observation/Orthostatic Postural Assessment:          Pt with forward head and rounded shoulders posture that corrects with occasional cueing. fluent neck and trunk movements. Still with shrugging and use of biceps for reaching  Palpation:           Generalized tenderness over right levator scapularis,   ROM: cervical AROM WNL.  RUE WNL except:                      RUE AROM  R Shoulder Flexion: 125(before shrugging)  R Shoulder Horizontal ABduction: 20                 Strength: RUE 5/5 except:       RUE Strength  R Shoulder Flexion: 4+  R Shoulder Extension: 5  R Shoulder ABduction: 4+  R Shoulder Horizontal ABduction: 4  R Shoulder Internal Rotation: 5(subscap 4+)  R Shoulder External Rotation: 5              Special Tests:          negative Right quadrant.  Mild upper limb nerve tension in Right shoulder after exercises

## 2021-03-05 ENCOUNTER — HOSPITAL ENCOUNTER (OUTPATIENT)
Dept: PHYSICAL THERAPY | Age: 61
End: 2021-03-05

## 2021-03-05 ENCOUNTER — HOSPITAL ENCOUNTER (OUTPATIENT)
Dept: PHYSICAL THERAPY | Age: 61
Discharge: HOME | End: 2021-03-05
Payer: COMMERCIAL

## 2021-03-05 PROCEDURE — 97162 PT EVAL MOD COMPLEX 30 MIN: CPT

## 2021-03-05 NOTE — PROGRESS NOTES
Emily Plummer  : 1960  Payor: Alana Overall / Plan: [facility] CROSS OF [address] [place] [address] Rd / Product Type: PPO /  Therapy Center at [facility] JESSI TURNER  [address] [facility], [address], [place] [address].  [phone]   [phone]       OUTPATIENT PHYSICAL THERAPY: Daily Treatment Note 3/12/2021  Visit Count: 1     ICD-10: Treatment Diagnosis: Neck pain (M54.2), neck pain with radiculopathy (M54.12)  Precautions/Allergies:   none  Erythromycin and Lortab [hydrocodone-acetaminophen]   TREATMENT PLAN:  Effective Dates: 3/5/2021 TO 2021 (30 days). Frequency/Duration: 2 times a week for 30 Day(s) MEDICAL/REFERRING DIAGNOSIS:  Radiculopathy, cervical region [M54.12]  Cervicalgia [M54.2]   DATE OF ONSET: 2021  REFERRING PHYSICIAN: Eliane Driver., *  MD Orders: PT- evaluate and treat, modalities, core strengthening and stretching, traction  Return MD Appointment: unsure       Pre-treatment Symptoms/Complaints:  Pt complains of neck, upper back and left arm pain to the forearm   Pain: Initial:   -10/10 Post Session:  1/10   Medications Last Reviewed:  3/5/21    Updated Objective Findings:   See evaluation     TREATMENT:   Therapeutic Exercise: (7 Minutes ):  Exercises to improve mobility. Required moderate verbal and manual cues to promote proper body alignment, promote proper body posture, promote proper body breathing techniques and exercise technique. Reviewed HEP with posture correction chin tuck, scapular retraction/depression seated and supine, B shoulder ER in supine without pillow    Treatment/Session Summary:    · Response to Treatment:  Good return demonstration of exercises. · Communication/Consultation:  None today  · Equipment provided today:  None today  · Recommendations/Intent for next treatment session: Next visit will focus on review HEP, manual treatment as needed.     Total Treatment Billable Duration:  7 minutes with evaluation  PT Patient Time In/Time Out  Time In: 7617  Time Out: Lynda 25, PT    Future Appointments   Date Time Provider Roc Lomeli   3/15/2021 10:30 AM DANIELLE Polanco [facility]   3/19/2021  8:30 AM DANIELLE Polanco [facility]

## 2021-03-05 NOTE — THERAPY EVALUATION
Bianca Garcia : 1960 Primary: Pablo [facility]* Secondary:  [facility]rapy Center at [facility] JESSI SHOREA [address] [facility], [address] [place] [address],[address], [facility]. [phone]   [phone] OUTPATIENT PHYSICAL THERAPY:Initial Assessment 3/5/2021 ICD-10: Treatment Diagnosis: neck pain (M54.2),neck pain with radiculopathy (M54.12) Precautions/Allergies: none/Erythromycin and Lortab [hydrocodone-acetaminophen] TREATMENT PLAN: 
Effective Dates: 3/5/2021 TO 2021 (30 days). Frequency/Duration: 2 times a week for 30 Day(s) MEDICAL/REFERRING DIAGNOSIS: 
Radiculopathy, cervical region [M54.12] Cervicalgia [M54.2] DATE OF ONSET: 2021 REFERRING PHYSICIAN: Dulce Stewart., * MD Orders: PT- evaluate and treat, modalities, core strengthening and stretching, include traction Return MD Appointment: unsure INITIAL ASSESSMENT:  Mr. Tessie Morton comes to therapy with complaints of neck, upper back and right arm pain that limiting his ability to perform ADL's, work and now sleep. He presents with significant soft tissue tightness in the cervical and anterior chest region with rotator cuff and mid back weakness that all contribute to altered posture and nerve tension to the right wrist. Arm pain can be reproduced with median nerve tension and first rib mobilization. 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 He will benefit from skilled therapy to address deficits and assist in returning to full work duties and ADL's with less pain. PROBLEM LIST (Impacting functional limitations): 1. Decreased Strength 2. Decreased ADL/Functional Activities 3. Increased Pain 4. Decreased Activity Tolerance 5. Decreased Flexibility/Joint Mobility INTERVENTIONS PLANNED: (Treatment may consist of any combination of the following) 1. Heat 2. Home Exercise Program (HEP) 3. Manual Therapy 4. Neuromuscular Re-education/Strengthening 5. Range of Motion (ROM) 6. Therapeutic Exercise/Strengthening GOALS: (Goals have been discussed and agreed upon with patient.) Short-Term Functional Goals: Time Frame: 2 weeks 1. Independent with HEP 2.CervicalAROM WNL to allow independent ADL's without compensation 3. Pt to demonstrate proper posture for arm symptom relief Discharge Goals: Time Frame: 30 days 1. Tolerate activity > 45 minutes for job and home care 2. ADL's with pain < 3/10 
   3. RUE strength 5/5 for job duties and home care 4. Neck disability Index score improved by at least 20 to demonstrate improved function OUTCOME MEASURE:  
Tool Used: Neck Disability Index (NDI) Score:  Initial: 17/50  Most Recent: X/50 (Date: -- ) Interpretation of Score: The Neck Disability Index is a revised form of the Oswestry Low Back Pain Index and is designed to measure the activities of daily living in person's with neck pain. Each section is scored on a 0-5 scale, 5 representing the greatest disability. The scores of each section are added together for a total score of 50. MEDICAL NECESSITY:  
· Skilled intervention continues to be required due to need for manual treatment and guided introduction to modified exercise. REASON FOR SERVICES/OTHER COMMENTS: 
· Patient continues to require skilled intervention due to need for pt to return to previous level of function for home and job duties. Total Duration: 55 minutes PT Patient Time In/Time Out Time In: 0945 Time Out: 1040 Rehabilitation Potential For Stated Goals: Good Regarding Johny Parada's therapy, I certify that the treatment plan above will be carried out by a therapist or under their direction. Thank you for this referral, Kingston Wasserman, PT,MSPT Referring Physician Signature: Attila Astorga., * No Signature is Required for this note.    
 
PAIN/SUBJECTIVE:  
 Initial:   4-10/10 Post Session:  2/10 HISTORY:  
History of Injury/Illness (Reason for Referral): 
Pt reports having a gradual return of neck, upper back and right arm pain but is unsure of the cause. He tried using a friend's home cervical unit with temporary relief. The pain limited his ability to perform necessary duties for his job in the produce dept at [employer], home care and then sleeping. Past Medical History/Comorbidities: o 
Mr. Dave Perez  has a past medical history of Arthritis, Cancer, neck pain  Mr. Dave Perez  has a past surgical history that includes hx orchiectomy. Social History/Living Environment:  
  pt lives with wife and son. Cares for home and yard Prior Level of Function/Work/Activity: 
Works at [employer]. Was in [employer] department and now in [employer]. Also works as a referee. Previous Treatment Approaches: PT for neck pain a year ago with good outcome and HEP. Personal Factors:   
      Profession:  Having to lift, reach overhead, push and pull Past/Current Experience:  Neck pain with upper body weakness Other factors that influence how disability is experienced by the patient:  Core weakness Ambulatory/Rehab Services H2 Model Falls Risk Assessment Risk Factors: 
     No Risk Factors Identified (1)  Gender [Male] Ability to Rise from Chair: 
     (0)  Ability to rise in a single movement Falls Prevention Plan: No modifications necessary Total: (5 or greater = High Risk): 1 ©2010 [facility] [address] [place] [address] Patent #[id number]. Federal Law prohibits the replication, distribution or use without written permission from [facility] [employer] Current Medications: No current outpatient medications on file. Date Last Reviewed:  3/5/21 Number of Personal Factors/Comorbidities that affect the Plan of Care: 1-2: MODERATE COMPLEXITY EXAMINATION:  
Observation/Orthostatic Postural Assessment: Forward head with B shoulder protraction and right scapular elevation Palpation:   
      Tender over right 1st rib, right subscap, tight B pect minor. Stiff and restricted right C5-6 Special tests: positive ULTT for right median nerve, positive R TOS  
ROM:   
      B UE, trunk and neck WNL Strength:core 3+/5. BUE 5/5 except:    
  
  RUE Strength R Shoulder Flexion: 4 
R Shoulder Horizontal ABduction: 4 
R Shoulder Internal Rotation: 4+(subscap 3+) R Shoulder External Rotation: 4 Body Structures Involved: 1. Nerves 2. Joints 3. Muscles 4. Ligaments Body Functions Affected: 1. Sensory/Pain 2. Neuromusculoskeletal 
3. Movement Related Activities and Participation Affected: 1. General Tasks and Demands 2. Communication 3. Self Care 4. job Number of elements (examined above) that affect the Plan of Care: 3: MODERATE COMPLEXITY CLINICAL PRESENTATION:  
Presentation: Evolving clinical presentation with changing clinical characteristics: MODERATE COMPLEXITY CLINICAL DECISION MAKING:  
Use of outcome tool(s) and clinical judgement create a POC that gives a: Questionable prediction of patient's progress: MODERATE COMPLEXITY

## 2021-03-12 ENCOUNTER — HOSPITAL ENCOUNTER (OUTPATIENT)
Dept: PHYSICAL THERAPY | Age: 61
Discharge: HOME | End: 2021-03-12
Payer: COMMERCIAL

## 2021-03-12 PROCEDURE — 97110 THERAPEUTIC EXERCISES: CPT

## 2021-03-12 NOTE — PROGRESS NOTES
Arpit Kerr  : 1960  Payor: Giorgi Brady / Plan: [place] CROSS OF [address] [place] [address] Rd / Product Type: PPO /  [facility]rapy Center at [facility] JESSI TURNER  [address] [facility], [address], [place] [address].  [phone]   [phone]       OUTPATIENT PHYSICAL THERAPY: Daily Treatment Note 3/12/2021  Visit Count: 2     ICD-10: Treatment Diagnosis: Neck pain (M54.2), neck pain with radiculopathy (M54.12)  Precautions/Allergies:   none  Erythromycin and Lortab [hydrocodone-acetaminophen]   TREATMENT PLAN:  Effective Dates: 3/5/2021 TO 2021 (30 days). 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 Tape really helped  Pain: Initial:   1/10 Post Session:  0-1/10   Medications Last Reviewed:  3/5/21    Updated Objective Findings:   Very slouched posture with forward heaad. TREATMENT:   Kinesio taping  For lower trap facilitation and posture correction  Therapeutic Exercise: (29 Minutes ):  Exercises to improve mobility and strength  Required moderate verbal and manual cues to promote proper body alignment, promote proper body posture, promote proper body breathing techniques and exercise technique. B pect minor and major release/stretch in supine without pillow. Prone B shoulder 90/90 and addition of midtrap, mid trap lift,low trap lift, shoulder extn- 2x5 ea. reviewed and issued upgraded hEP with prone B mid trap, lower trap and shoulder extn with activation of cervical paraspinals. Treatment/Session Summary:    · Response to Treatment:  Good return demonstration of exercises after review.  Needs to improve B shoulder and back strength  · Communication/Consultation:  None today  · Equipment provided today:  None today  · Recommendations/Intent for next treatment session: Next visit will focus on review HEP, strengthening and manual treatment as needed.     Total Treatment Billable Duration: 29  minutes  PT Patient Time In/Time Out  Time In: 0830  Time Out: 0900    Holley Pimentel PT    Future Appointments   Date Time Provider Roc Lomeli   3/15/2021 10:30 AM Lorenzo Mccoy, PT MICHAEL MARTINEZ   3/19/2021  8:30 AM Lorenzo Mccoy, PT [facility]   3/24/2021 10:30 AM Lorenzo Mccoy, PT [facility]

## 2021-03-15 ENCOUNTER — HOSPITAL ENCOUNTER (OUTPATIENT)
Dept: PHYSICAL THERAPY | Age: 61
Discharge: HOME | End: 2021-03-15
Payer: COMMERCIAL

## 2021-03-15 PROCEDURE — 97110 THERAPEUTIC EXERCISES: CPT

## 2021-03-15 PROCEDURE — 97140 MANUAL THERAPY 1/> REGIONS: CPT

## 2021-03-15 NOTE — PROGRESS NOTES
Fariba Paris  : 1960  Payor: Shaq Gutierrez / [place] [address] [place] [address] Rd / Product Type: PPO /  Therapy Center at [facility] JESSI TURNER  [address] [place] [address], [address], [place] [address].  [phone]   [phone]       OUTPATIENT PHYSICAL THERAPY: Daily Treatment Note 3/15/2021  Visit Count: 3     ICD-10: Treatment Diagnosis: Neck pain (M54.2), neck pain with radiculopathy (M54.12)  Precautions/Allergies:   none  Erythromycin and Lortab [hydrocodone-acetaminophen]   TREATMENT PLAN:  Effective Dates: 3/5/2021 TO 2021 (30 days). Frequency/Duration: 2 times a week for 30 Day(s) MEDICAL/REFERRING DIAGNOSIS:  Radiculopathy, cervical region [M54.12]  Cervicalgia [M54.2]   DATE OF ONSET: 2021  REFERRING PHYSICIAN: Gladis Gutierrez, *  MD Orders: PT- evaluate and treat, modalities, core strengthening and stretching, traction  Return MD Appointment: 3/22/21       Pre-treatment Symptoms/Complaints:  Pt reports doing HEP only once a day. Tape really helped, still wearing it. Woke up with the right hand being numb but exercises helped  Pain: Initial:   3 down to 1/10 right more than left Post Session:  0/10 tired   Medications Last Reviewed:  3/15/21    Updated Objective Findings:    Slouched posture with forward head corrected 75% with posture correction  Tender to palpate over the right subscap and pect minor. Tight B pects and subscap with the right more so than the left     TREATMENT:   Kinesio tape kept intact for lower trap facilitation and posture correction  Therapeutic Exercise: (25 Minutes ):  Exercises to improve mobility and strength  Required moderate verbal and manual cues to promote proper body alignment, promote proper body posture, promote proper body breathing techniques and exercise technique. B pect minor and major release/stretch in supine without pillow.  Supine LTR with core and B midtrap/ chin tuck no pillow Prone B shoulder mid trap lift 1#,low trap lift 1#, shoulder extn 1#- 2x12 ea. Manual treatment (15 minutes): grade 4- B shoulder ER at end range horizontal abd, lower cervical extn/upper cervical flexion mobilizations   Treatment/Session Summary:    · Response to Treatment:  Good return demonstration of exercises after stretching. Needs to stretch prior to strengthening  · Communication/Consultation:  None today  · Equipment provided today:  None today  · Recommendations/Intent for next treatment session: Next visit will focus on review HEP, strengthening and manual treatment as needed.     Total Treatment Billable Duration: 40  minutes  PT Patient Time In/Time Out  Time In: 1030  Time Out: [address], [place] Future Appointments   Date Time Provider Roc Lomeli   3/19/2021  8:30 AM DANIELLE Silva [facility]   3/24/2021 10:30 AM DANIELLE Silva [facility]

## 2021-03-19 ENCOUNTER — HOSPITAL ENCOUNTER (OUTPATIENT)
Dept: PHYSICAL THERAPY | Age: 61
Discharge: HOME | End: 2021-03-19
Payer: COMMERCIAL

## 2021-03-19 PROCEDURE — 97110 THERAPEUTIC EXERCISES: CPT

## 2021-03-19 PROCEDURE — 97140 MANUAL THERAPY 1/> REGIONS: CPT

## 2021-03-19 NOTE — PROGRESS NOTES
Ramona Wilson  : 1960  Payor: Davie Bey / [place] [address] [place] [address] Rd / Product Type: PPO /  Therapy Center at [facility] JESSI TURNER  [address] [place] [address], [address], [address] [place] [address]  [phone]   [phone]       OUTPATIENT PHYSICAL THERAPY: Daily Treatment Note 3/19/2021  Visit Count: 4     ICD-10: Treatment Diagnosis: Neck pain (M54.2), neck pain with radiculopathy (M54.12)  Precautions/Allergies:   none  Erythromycin and Lortab [hydrocodone-acetaminophen]   TREATMENT PLAN:  Effective Dates: 3/5/2021 TO 2021 (30 days). Frequency/Duration: 2 times a week for 30 Day(s) MEDICAL/REFERRING DIAGNOSIS:  Radiculopathy, cervical region [M54.12]  Cervicalgia [M54.2]   DATE OF ONSET: 2021  REFERRING PHYSICIAN: Ciera Arreola, *  MD Orders: PT- evaluate and treat, modalities, core strengthening and stretching, traction  Return MD Appointment: 3/22/21       Pre-treatment Symptoms/Complaints:  Pt reports doing HEP only once a day. Been hurting in the neck. Did nothing yesterday. Pain: Initial:   2 /10 neck and down the right shoulder Post Session: it's ok   Medications Last Reviewed:  3/15/21    Updated Objective Findings:    Slouched posture with less forward head   Tender to palpate over the right subscap and upper trap. Tight and stiff B shoulder ER and flexion with the right more so  Right shoulder flex: 105 deg, ER 60 deg  Left shoulder flex: 100 deg, ER 55 deg  Pt demonstrated prone technique with home program- noted to have hyper extended and rotated neck during shoulder exs. TREATMENT:   Kinesio tape not re-applied  Therapeutic Exercise: (10 Minutes ): reviewed proper HEP technique when prone to have head in neutral cervical ext. Exercises to improve mobility and strength  Required moderate verbal and manual cues to promote proper body alignment, promote proper body posture, promote proper body breathing techniques and exercise technique.  R subscap AIS in supine without pillow. Prone B shoulder mid trap lift 1#,low trap lift 1#, shoulder extn 1#- 2x10 ea. Manual treatment (18 minutes): grade 4-- to 4 B shoulder ER at end range horizontal abd and shoulder flex; lower cervical extn/upper cervical flexion mobilizations   Treatment/Session Summary:    · Response to Treatment:  Needs to stretch and needs to perform HEP as reviewed with neutral spine prior to strengthening  · Communication/Consultation:  None today  · Equipment provided today:  None today  · Recommendations/Intent for next treatment session: Next visit will focus on strengthening and B shoulder ROM as needed.     Total Treatment Billable Duration: 28  minutes  PT Patient Time In/Time Out  Time In: 9054  Time Out: [address] [facility], PT    Future Appointments   Date Time Provider Roc Lomeli   3/24/2021 10:30 AM Herminia Colbert, PT [facility]   3/26/2021  2:00 PM Herminia Colbert, PT [facility]   3/30/2021 10:30 AM Herminia Colbert PT [facility] MARTINE[facility]   4/2/2021  1:15 PM Herminia Colbert, PT MICHAEL BELLIUM

## 2021-03-24 ENCOUNTER — HOSPITAL ENCOUNTER (OUTPATIENT)
Dept: PHYSICAL THERAPY | Age: 61
Discharge: HOME | End: 2021-03-24
Payer: COMMERCIAL

## 2021-03-24 PROCEDURE — 97110 THERAPEUTIC EXERCISES: CPT

## 2021-03-24 PROCEDURE — 97140 MANUAL THERAPY 1/> REGIONS: CPT

## 2021-03-24 NOTE — PROGRESS NOTES
Tiny Suárez  : 1960  Payor: Venessa Guard / [place] OF [address] [facility] [address] / Product Type: PPO /  [facility]rapy Center at [facility] JESSI TURNER  [address] [facility], [address], [place] [address].  [phone]   [phone]       OUTPATIENT PHYSICAL THERAPY: Daily Treatment Note 3/24/2021  Visit Count: 5     ICD-10: Treatment Diagnosis: Neck pain (M54.2), neck pain with radiculopathy (M54.12)  Precautions/Allergies:   none  Erythromycin and Lortab [hydrocodone-acetaminophen]   TREATMENT PLAN:  Effective Dates: 3/5/2021 TO 2021 (30 days). Frequency/Duration: 2 times a week for 30 Day(s) MEDICAL/REFERRING DIAGNOSIS:  Radiculopathy, cervical region [M54.12]  Cervicalgia [M54.2]   DATE OF ONSET: 2021  REFERRING PHYSICIAN: Roland Villalta, *  MD Orders: PT- evaluate and treat, modalities, core strengthening and stretching, traction  Return MD Appointment: 3/22/21       Pre-treatment Symptoms/Complaints:  Pt reports doing better until having to drive with all the pot holes. Did the HEP  Pain: Initial:    1-5 /10 neck and both shoulders Post Session: no number given   Medications Last Reviewed:  3/24/21    Updated Objective Findings:    Slouched posture with less forward head   Very tight B pect       TREATMENT:     Therapeutic Exercise: (15 Minutes ):  Exercises to improve mobility and strength  Required moderate verbal and manual cues to promote proper body alignment, promote proper body posture, promote proper body breathing techniques and exercise technique.  R subscap AIS in supine without pillow, manual resisted ER and IR followed by resisted eccentrics; pt contract-relax end range B flexion after over pressure stretch  Manual treatment (25 minutes): grade 4-- to 4 B shoulder ER through abduction range, horizontal abd and shoulder flex; lower cervical extn/upper cervical flexion mobilizations   Treatment/Session Summary:    · Response to Treatment:  Very tight B pect muscles. Pt advised to do strengthening at home while stretched out. Pt agreed  · Communication/Consultation:  None today  · Equipment provided today:  None today  · Recommendations/Intent for next treatment session: Next visit will focus on progressive strengthening and B shoulder ROM as needed.     Total Treatment Billable Duration:  40  minutes  PT Patient Time In/Time Out  Time In: 1030  Time Out: [address] [facility], PT    Future Appointments   Date Time Provider Roc Lomeli   3/26/2021  2:00 PM Giovanna [facility]anco[facility]   3/30/2021 10:30 AM Lori Cardona, PT [facility]   4/2/2021  1:15 PM Lori Cardona, DANIELLE GABRIELTPATRICIA BELLIUM   4/6/2021 10:30 AM Lori Cardona, PT [facility]   4/9/2021 10:30 AM Lori Cardona, DANIELLE CLAIREORPTPATRICIA BELLIUM

## 2021-03-26 ENCOUNTER — HOSPITAL ENCOUNTER (OUTPATIENT)
Dept: PHYSICAL THERAPY | Age: 61
Discharge: HOME | End: 2021-03-26
Payer: COMMERCIAL

## 2021-03-26 PROCEDURE — 97110 THERAPEUTIC EXERCISES: CPT

## 2021-03-26 PROCEDURE — 97140 MANUAL THERAPY 1/> REGIONS: CPT

## 2021-03-26 NOTE — PROGRESS NOTES
[facility]  : 1960  Payor: Donell Monroy / [place] OF [address] [place] [address] Rd / Product Type: PPO /  Therapy Center at [facility] JESSI TURNER  [address] [place] [address], [address], [address] [facility]  [phone]   [phone]       OUTPATIENT PHYSICAL THERAPY: Daily Treatment Note 3/26/2021  Visit Count: 6     ICD-10: Treatment Diagnosis: Neck pain (M54.2), neck pain with radiculopathy (M54.12)  Precautions/Allergies:   none  Erythromycin and Lortab [hydrocodone-acetaminophen]   TREATMENT PLAN:  Effective Dates: 3/5/2021 TO 2021 (30 days). Frequency/Duration: 2 times a week for 30 Day(s) MEDICAL/REFERRING DIAGNOSIS:  Radiculopathy, cervical region [M54.12]  Cervicalgia [M54.2]   DATE OF ONSET: 2021  REFERRING PHYSICIAN: Lamont Hairston., *  MD Orders: PT- evaluate and treat, modalities, core strengthening and stretching, traction  Return MD Appointment: unsure       Pre-treatment Symptoms/Complaints:  Pt reports being sore from working and wearing their apron over his neck. Did the HEP  Pain: Initial:    3-4 /10 neck and both shoulders Post Session: feels much better   Medications Last Reviewed:  3/24/21    Updated Objective Findings:   Less slouched posture with less forward head   Very tight B pect pulls B shoulders into protraction       TREATMENT:   kinesion taping for paraspinal and midtrap facilitation   Therapeutic Exercise: (16 Minutes ):  Exercises to improve mobility and strength  Required moderate verbal and manual cues to promote proper body alignment, promote proper body posture, promote proper body breathing techniques and exercise technique. Prone B midtrap, shoulder extn and lower trap with 2# 2x10. manual resisted concentric and eccentric ER and IR. Hold- relax end range B flexion after over pressure stretch with pt supine without pillow.   Manual treatment (26 minutes): grade 4-- to 4 B shoulder horizontal abd with ER/IR and shoulder flex; lower cervical extn/upper cervical flexion mobilizations; B pect and subscap release with stretch; shoulder flexion with scapula stabilized. Treatment/Session Summary:    · Response to Treatment:  Good improvement of B shoulder ROM but still with tight B pect muscles. Pt advised to remove tape if skin becomes irritated, otherwise wear it for 3 days  · Communication/Consultation:  None today  · Equipment provided today:  None today  · Recommendations/Intent for next treatment session: Next visit will focus on progressive strengthening and B shoulder ROM as needed.     Total Treatment Billable Duration:  42  minutes  PT Patient Time In/Time Out  Time In: 1400  Time Out: [address] [facility], PT    Future Appointments   Date Time Provider Roc Lomeli   3/30/2021 10:30 AM [facility] MICHAEL MARTINEZ   4/2/2021  1:15 PM DANIELLE [facility]   4/6/2021 10:30 AM DANIELLE [facility]   4/9/2021 10:30 AM DANIELLE [facility]

## 2021-03-30 ENCOUNTER — HOSPITAL ENCOUNTER (OUTPATIENT)
Dept: PHYSICAL THERAPY | Age: 61
Discharge: HOME | End: 2021-03-30
Payer: COMMERCIAL

## 2021-03-30 PROCEDURE — 97140 MANUAL THERAPY 1/> REGIONS: CPT

## 2021-03-30 PROCEDURE — 97110 THERAPEUTIC EXERCISES: CPT

## 2021-03-30 NOTE — PROGRESS NOTES
Judy Schmitz  : 1960  Payor: Nadja Schmitt / [place]: [place] [address] [address] [facility] [address] / Product Type: PPO /  [facility] JESSI TURNER  [address] [facility], [address], [place] [address].  [phone]   [phone]       OUTPATIENT PHYSICAL THERAPY: Daily Treatment Note 3/30/2021  Visit Count: 7     ICD-10: Treatment Diagnosis: Neck pain (M54.2), neck pain with radiculopathy (M54.12)  Precautions/Allergies:   none  Erythromycin and Lortab [hydrocodone-acetaminophen]   TREATMENT PLAN:  Effective Dates: 3/5/2021 TO 2021 (30 days). Frequency/Duration: 2 times a week for 30 Day(s) MEDICAL/REFERRING DIAGNOSIS:  Radiculopathy, cervical region [M54.12]  Cervicalgia [M54.2]   DATE OF ONSET: 2021  REFERRING PHYSICIAN: Sowmya Newman., *  MD Orders: PT- evaluate and treat, modalities, core strengthening and stretching, traction  Return MD Appointment: unsure       Pre-treatment Symptoms/Complaints:  Pt reports the neck is feeling pretty good today. Reports having to lay in the floor Saturday from pain. Did the HEP without relief. Pain: Initial:     7 down to1/10 neck and both shoulders Post Session: no number given. No complaints   Medications Last Reviewed:  3/30/21    Updated Objective Findings:    very slouched forward head posture corrected only 50% with cueing   Very tight B pect and lower cervical flexion       TREATMENT:   kinesion taping not re-applied  Therapeutic Exercise: (30 Minutes ):  Exercises to improve mobility and strength  Required moderate verbal and manual cues to promote proper body alignment, promote proper body posture, promote proper body breathing techniques and exercise technique. Reviewed seated chin tuck with overpressure 4x3 reps with corrected posture, LTR with BLE over tball 1# B ankles and in hooklying with B arms in horizontal abd and shoulder ER with hands behind head 2x10 reps each position.  Performed prone B midtrap, shoulder extn and lower trap with 2# 2x10. manual ressisted hold- relax end range B flexion after over pressure stretch with pt supine without pillow. Manual treatment (10  minutes): grade 4-- to 4 B shoulder flex and right shoulder ER; lower cervical extn/upper cervical flexion mobilizations; B pect stretch and release with pt in shoulder ER/hands behind head. Treatment/Session Summary:    · Response to Treatment:  Significant review of posture correction using overpressure chin tuck, scapular retraction and depression. still with tight B pect muscles with pt needing to actively correct with chin tuck in sitting and standing throughout the day. Muscle weakness not able to overcome stiffness and pect tightness. · Communication/Consultation:  None today  · Equipment provided today:  None today  · Recommendations/Intent for next treatment session: Next visit will focus on progressive strengthening and B shoulder ROM as needed.  Measure shoulders    Total Treatment Billable Duration:  40  minutes  PT Patient Time In/Time Out  Time In: 1030  Time Out: [address], PT    Future Appointments   Date Time Provider Roc Lomeli   4/2/2021  1:15 PM Darlina Bias, [place] [facility]   4/6/2021 10:30 AM Darlina Bias, [place] [facility]   4/9/2021 10:30 AM Darlina Bias, [place] [facility]   4/12/2021  2:30 PM Darlina Bias, [facility]   4/14/2021  1:00 PM Darlina Bias, PT [facility]

## 2021-04-06 ENCOUNTER — HOSPITAL ENCOUNTER (OUTPATIENT)
Dept: PHYSICAL THERAPY | Age: 61
Discharge: HOME | End: 2021-04-06
Payer: COMMERCIAL

## 2021-04-06 PROCEDURE — 97110 THERAPEUTIC EXERCISES: CPT

## 2021-04-06 PROCEDURE — 97140 MANUAL THERAPY 1/> REGIONS: CPT

## 2021-04-06 NOTE — PROGRESS NOTES
Ryan Oconnor  : 1960  Payor: Barren Fuelling / Plan: [place] [employer] CROSS OF [address] [place] [address] Rd / Product Type: PPO /  [facility]rapy Center at [facility] JESSI TURNER  [facility], [address], [place] [address].  [phone]   [phone]       OUTPATIENT PHYSICAL THERAPY: Daily Treatment Note 2021  Visit Count: 8     ICD-10: Treatment Diagnosis: Neck pain (M54.2), neck pain with radiculopathy (M54.12)  Precautions/Allergies:   none  Erythromycin and Lortab [hydrocodone-acetaminophen]   TREATMENT PLAN:  Effective Dates: 3/5/2021 TO 2021 (30 days). Frequency/Duration: 2 times a week for 30 Day(s) MEDICAL/REFERRING DIAGNOSIS:  Radiculopathy, cervical region [M54.12]  Cervicalgia [M54.2]   DATE OF ONSET: 2021  REFERRING PHYSICIAN: Emmanuel Grewal, *  MD Orders: PT- evaluate and treat, modalities, core strengthening and stretching, traction  Return MD Appointment: unsure       Pre-treatment Symptoms/Complaints:  Pt reports the neck and shoulder on the left are feeling pretty good   Pain: Initial:    -2/10 Post Session: can move much better   Medications Last Reviewed:  21    Updated Objective Findings:    very slouched forward head posture corrected only 80% with cueing    tight B pect and inferior B shoulder joint capsule        TREATMENT:     Therapeutic Exercise: (30 Minutes ):  Exercises to improve mobility and strength  Required moderate verbal and manual cues to promote proper body alignment, promote proper body posture, promote proper body breathing techniques and exercise technique. Manual resisted hold- relax end range B flexion and ER after over pressure stretch with pt supine without pillow. Practiced chin tucks into treatment table after manual positioning into lower cervical neutral. Performed prone B midtrap, shoulder extn and lower trap with 2.5# 2x10.   Manual treatment (10  minutes): grade 4-- to 4 B shoulder ER through to end ROM Abd, B shoulder flex; grade 4- inferior glides of R humerus at 90 deg flex and abd ; lower cervical extn mobilizations;  left scalene release. Treatment/Session Summary:    · Response to Treatment:  Good improvements in B shoulder ER and flex. still with tight B pect and scapular stabilizer weakness not able to overcome stiffness contributing to posture anc neck pain. · Communication/Consultation:  None today  · Equipment provided today:  None today  · Recommendations/Intent for next treatment session: Next visit will focus on progressive strengthening and B shoulder ROM as needed.  Shoulder measurements    Total Treatment Billable Duration:  40 minutes  PT Patient Time In/Time Out  Time In: 1030  Time Out: [address] [facility], PT    Future Appointments   Date Time Provider Roc Lomeli   4/9/2021 10:30 AM DANIELLE Donovan   4/12/2021  2:30 PM DANIELLE Donovan   4/14/2021  1:00 [facility]

## 2021-04-09 ENCOUNTER — APPOINTMENT (OUTPATIENT)
Dept: PHYSICAL THERAPY | Age: 61
End: 2021-04-09
Payer: COMMERCIAL

## 2021-04-12 ENCOUNTER — HOSPITAL ENCOUNTER (OUTPATIENT)
Dept: PHYSICAL THERAPY | Age: 61
Discharge: HOME | End: 2021-04-12
Payer: COMMERCIAL

## 2021-04-12 PROCEDURE — 97140 MANUAL THERAPY 1/> REGIONS: CPT

## 2021-04-12 PROCEDURE — 97110 THERAPEUTIC EXERCISES: CPT

## 2021-04-12 NOTE — THERAPY DISCHARGE
Sylvester Bender : 1960 Primary: [facility]* Secondary:  [facility] at [facility] JESSI SHOREA [address] [facility], [address] [place] [address] [address], [address] [facility] [phone]   [phone] OUTPATIENT PHYSICAL THERAPY:Discharge Summary 2021 ICD-10: Treatment Diagnosis: neck pain (M54.2),neck pain with radiculopathy (M54.12) Precautions/Allergies: none/Erythromycin and Lortab [hydrocodone-acetaminophen] PLAN: 
Discharge to [facility] MEDICAL/REFERRING DIAGNOSIS: 
Radiculopathy, cervical region [M54.12] Cervicalgia [M54.2] DATE OF ONSET: 2021 REFERRING PHYSICIAN: Lis Alcantara., * MD Orders: PT- evaluate and treat, modalities, core strengthening and stretching, include traction Return MD Appointment: unsure ASSESSMENT:  Mr. Chad Jensen came to therapy with complaints of neck, upper back and right arm pain that limiting his ability to perform ADL's, work and now sleep. He presents today with 0/10 neck pain, BUE strength 5/5 with corrected posture, and independent with his home program. Although improved he continues to demonstrate tight B shoulders and slouched posture that both contribute to his headaches. Anticipate continued improvements in all areas with HEP compliance. Goals have been met. PROBLEM LIST (Impacting functional limitations): 1. Decreased Strength 2. Decreased ADL/Functional Activities 3. Increased Pain 4. Decreased Activity Tolerance 5. Decreased Flexibility/Joint Mobility INTERVENTIONS PLANNED: (Treatment may consist of any combination of the following) 1. Heat 2. Home Exercise Program (HEP) 3. Manual Therapy 4. Neuromuscular Re-education/Strengthening 5. Range of Motion (ROM) 6. Therapeutic Exercise/Strengthening GOALS: (Goals have been discussed and agreed upon with patient.) Short-Term Functional Goals: Time Frame: 2 weeks 1. Independent with HEP- met 2. CervicalAROM WNL to allow independent ADL's without compensation- met 3. Pt to demonstrate proper posture for arm symptom relief- met Discharge Goals: Time Frame: 30 days 1. Tolerate activity > 45 minutes for job and home care- met 2. ADL's with pain < 3/10- met 3. RUE strength 5/5 for job duties and home care- met 4. Neck disability Index score improved by at least 20 to demonstrate improved function- met OUTCOME MEASURE:  
Tool Used: Neck Disability Index (NDI) Score:  Initial: 17/50  Most Recent: 2/50 (Date:4/12/21 ) Interpretation of Score: The Neck Disability Index is a revised form of the Oswestry Low Back Pain Index and is designed to measure the activities of daily living in person's with neck pain. Each section is scored on a 0-5 scale, 5 representing the greatest disability. The scores of each section are added together for a total score of 50. Thank you for this referral, Satish Irene, PT,MSPT Referring Physician Signature: Andrey Bal, * No Signature is Required for this note. PAIN/SUBJECTIVE:  
Initial:   0/10. Doing HEP. Occasional headaches Post Session:  0/10 ready for discharge HISTORY:  
    
   
EXAMINATION:  
Observation/Orthostatic Postural Assessment: Forward head with B shoulder protraction corrects with cueing Palpation:   
      Tight B shoulders prior to stretching Special tests: negative ULTT for right median nerve, positive R TOS  
ROM:   
      B UE, trunk and neck WNL Strength:core 4/5. BUE 5/5 except:    
  
  RUE Strength R Shoulder Flexion: 5 
R Shoulder Horizontal ABduction: 5 
R Shoulder Internal Rotation: 5(subscap 4) R Shoulder External Rotation: 5

## 2021-04-12 NOTE — PROGRESS NOTES
Giselle Gamble  : 1960  Payor: Evelyn Frazier / Plan: [facility] / Product Type: PPO /  [address] at [facility] JESSI TURNER  [address] [facility], [address], [address] [place] [address]  [phone]   [phone]       OUTPATIENT PHYSICAL THERAPY: Daily Treatment Note 2021  Visit Count: 9     ICD-10: Treatment Diagnosis: Neck pain (M54.2), neck pain with radiculopathy (M54.12)  Precautions/Allergies:   none  Erythromycin and Lortab [hydrocodone-acetaminophen]    PLAN:  Discharge to [facility] per pt feeling ready MEDICAL/REFERRING DIAGNOSIS:  Radiculopathy, cervical region [M54.12]  Cervicalgia [M54.2]   DATE OF ONSET: 2021  REFERRING PHYSICIAN: Melvin Sol, *  MD Orders: PT- evaluate and treat, modalities, core strengthening and stretching, traction  Return MD Appointment: unsure       Pre-treatment Symptoms/Complaints:  Pt reports the neck and shoulder are good. Ready to be discharged. Asking about doing HEP daily   Pain: Initial:    0 /10 Post Session:0   Medications Last Reviewed:  21    Updated Objective Findings:    very slouched forward head posture corrected with cueing    tight B pect      TREATMENT:     Therapeutic Exercise: (15 Minutes ):  Exercises to improve mobility and strength  Required moderate verbal and manual cues to promote proper body alignment, promote proper body posture, promote proper body breathing techniques and exercise technique. Reviewed need to continue prone B midtrap, shoulder extn and lower trap with 2# weights at home. Manual treatment (15  minutes): grade 4-- to 4 B shoulder ER through to end ROM Abd, B shoulder flex; left subscap release   Treatment/Session Summary:    · Response to Treatment:  Good improvements in B shoulder ROM and posture. Pt advised to keep shoulder and neck exercise to prevent neck pain in the future.    · Communication/Consultation:  None today  · Equipment provided today:  None today  · Recommendations: discharge to [facility]    Total Treatment Billable Duration:  30 minutes  PT Patient Time In/Time Out  Time In: 1430  Time Out: [address], PT    No future appointments.

## 2021-04-14 ENCOUNTER — APPOINTMENT (OUTPATIENT)
Dept: PHYSICAL THERAPY | Age: 61
End: 2021-04-14
Payer: COMMERCIAL

## 2022-05-13 ENCOUNTER — HOSPITAL ENCOUNTER (OUTPATIENT)
Dept: PHYSICAL THERAPY | Age: 62
Discharge: HOME | End: 2022-05-13
Payer: COMMERCIAL

## 2022-05-13 DIAGNOSIS — M54.2 CERVICALGIA: ICD-10-CM

## 2022-05-13 PROCEDURE — 97110 THERAPEUTIC EXERCISES: CPT

## 2022-05-13 PROCEDURE — 97162 PT EVAL MOD COMPLEX 30 MIN: CPT

## 2022-05-13 NOTE — PROGRESS NOTES
Catie Steel  : 1960  Payor: Max Garcia / [place] OF [address] [place] [address] Rd / Product Type: PPO /  Therapy Center at [facility] JESSI TURNER  [address] [facility], [address], [address] [facility]  [phone]   [phone]       OUTPATIENT PHYSICAL THERAPY: Daily Treatment Note 2022  Visit Count: 1     ICD-10: Treatment Diagnosis: neck pain (M54.2),neck pain with radiculopathy (M54.12)  Precautions/Allergies:   Amoxicillin trihydrate, Influenza virus vaccines, Soap, Erythromycin, and Lortab [hydrocodone-acetaminophen]   TREATMENT PLAN:  Effective Dates: 2022 TO 2022 (30 days). Frequency/Duration: 2 times a week for 30 Day(s) MEDICAL/REFERRING DIAGNOSIS:  Cervicalgia [M54.2]   DATE OF ONSET: Chronic  REFERRING PHYSICIAN: Anila Tolbert., *  MD Orders: PT- evaluate and treat, traction  Return MD Appointment: unsure       Pre-treatment Symptoms/Complaints:  Pt complains of constant right arm \"numbness pain\" down to the elbow. On occasion now with pain to the base of the right thumb  Pain: Initial:   7/10 Post Session:  Feels better than when I came in   Medications Last Reviewed:  22    Updated Objective Findings:   See evaluation note from today     TREATMENT:   Therapeutic Exercise: (15 Minutes):  Exercises to improve mobility and strength. Required moderate visual, manual and tactile cues to promote proper body alignment, promote proper body posture and technique. Reviewed and issue supine chin tucks, supine mid trap press, seated pelvic tilts with corrected posture, seated chin tucks. Treatment/Session Summary:    · Response to Treatment:  Good return demonstration of exercise. Pt had difficulty assuming and holding corrected posture due to weakness. · Communication/Consultation:  None today  · Equipment provided today:  None today  · Recommendations/Intent for next treatment session: Next visit will focus on reducing arm symptoms, manual traction.  Can try mechanical traction. Upgrade HEP. Pt will be going out of town and won't be back until 5/23/22    Total Treatment Billable Duration:  15 minutes in addition to eval  PT Patient Time In/Time Out  Time In: 1315  Time Out: [address], PT    No future appointments.

## 2022-05-13 NOTE — THERAPY EVALUATION
Alex Simon  : 1960  Primary: [facility]*  Secondary:  Therapy Center at [address] [place] [address] Rd  [address] [facility], [address], [place] [address].  [phone]   [phone]       OUTPATIENT PHYSICAL THERAPY:Initial Assessment 2022   ICD-10: Treatment Diagnosis: neck pain (M54.2),neck pain with radiculopathy (M54.12)  Precautions/Allergies: none/Amoxicillin trihydrate, Influenza virus vaccines, Soap, Erythromycin, and Lortab [hydrocodone-acetaminophen]   TREATMENT PLAN:  Effective Dates: 22 TO 6/10/22 (30 days). Frequency/Duration: 2 times a week for 30 Day(s) MEDICAL/REFERRING DIAGNOSIS:  Cervicalgia [M54.2]   DATE OF ONSET: Chronic  REFERRING PHYSICIAN: Rand Silva., *  MD Orders: PT- evaluate and treat, traction  Return MD Appointment: unsure     INITIAL ASSESSMENT:  Mr. Catie Khan returns to therapy with complaints of constant neck and shooting right arm \"numbness pain\" that is limiting his ability to perform ADL's and work. He returns with significant soft tissue tightness in the anterior chest region, excessive lower cervical flexion/ upper cervical extension. Core, rotator cuff and mid back weakness are contributing to altered posture. Right shoulder flexion and cervical rotation AROM is limited. Extension of C6 - T1 reduces arm symptoms. 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 He will benefit from skilled therapy to address deficits and assist in returning to full work duties and ADL's with less pain. PROBLEM LIST (Impacting functional limitations):  1. Decreased Strength  2. Decreased ADL/Functional Activities  3. Increased Pain  4. Decreased Activity Tolerance  5. Decreased Flexibility/Joint Mobility INTERVENTIONS PLANNED: (Treatment may consist of any combination of the following)  1. Heat  2. Home Exercise Program (HEP)  3. Manual Therapy  4.  Neuromuscular Re-education/Strengthening  5. Range of Motion (ROM)  6. Therapeutic Exercise/Strengthening   7. Cervical traction     GOALS: (Goals have been discussed and agreed upon with patient.)  Short-Term Functional Goals: Time Frame: 2 weeks    1. Independent with HEP    2.Cervical AROM WNL to allow independent ADL's without compensation    3. Pt to demonstrate proper posture for relief of arm symptoms  Discharge Goals: Time Frame: 30 days     1. ADL's with pain < 2/10      2. RUE strength 5/5 for job duties and home care     3. Pt independent with resolving arms symptoms     4. Pt to demonstrate proper lifting with corrected posture       OUTCOME MEASURE:   Tool Used: Neck Disability Index (NDI)  Score:  Initial: 7/50  Most Recent: X/50 (Date: -- )   Interpretation of Score: The Neck Disability Index is a revised form of the Oswestry Low Back Pain Index and is designed to measure the activities of daily living in person's with neck pain. Each section is scored on a 0-5 scale, 5 representing the greatest disability. The scores of each section are added together for a total score of 50. MEDICAL NECESSITY:   · Skilled intervention continues to be required due to need for manual treatment and guided introduction to modified exercise. REASON FOR SERVICES/OTHER COMMENTS:  · Patient continues to require skilled intervention due to need for pt to return to previous level of function without arm symptoms   Total Duration: 60 minutes    PT Patient Time In/Time Out  Time In: 1315  Time Out: 1415    Rehabilitation Potential For Stated Goals: Good  Regarding Lori Parada's therapy, I certify that the treatment plan above will be carried out by a therapist or under their direction. Thank you for this referral,  Christiana Hernandez, PT,MSPT     Referring Physician Signature: Emilie Green., * No Signature is Required for this note. PAIN/SUBJECTIVE:   Initial:   7/10 Post Session:   It feels better than when I came in   HISTORY:   History of Injury/Illness (Reason for Referral):  Pt reports the return of right arm pain for no particular reason one morning two weeks ago. Not sure if it was the way he slept. He feels cervical traction doesn't help.  did not do imaging. Was given steroids. Past Medical History/Comorbidities:   Mr. Annabella Wang  has a past medical history of Arthritis, testicular cancer, neck pain and right arm pain. Mr. Annabella Wang  has a past surgical history that includes hx orchiectomy. Social History/Living Environment:     pt lives with wife and son in a two story home. 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 Cares for home and mows uneven lawn on a riding mower. 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 Previous Treatment Approaches:          PT  [id number], 2019, 2021 for neck pain with good outcome. Personal Factors:          Profession:  In grocery produce dept having to lift, reach overhead, push and pull        Past/Current Experience:  PT for Neck pain with upper body weakness        Other factors that influence how disability is experienced by the patient:  Core,trunk and shoulder  weakness   Ambulatory/Rehab Services H2 Model Falls Risk Assessment   Risk Factors:       No Risk Factors Identified       (1)  Gender [Male] Ability to Rise from Chair:       (0)  Ability to rise in a single movement   Falls Prevention Plan:       No modifications necessary   Total: (5 or greater = High Risk): 1   ©2010 [facility] [address] [place] [address] Patent #[id number]. Federal Law prohibits the replication, distribution or use without written permission from [email or web address] [email or web address]   Current Medications:       Current Outpatient Medications:     predniSONE (STERAPRED DS) 10 mg dose pack, Take 1 Tablet by mouth See Admin Instructions.  See administration instruction per 10mg dose pack, Disp: 21 Tablet, Rfl: 1    albuterol (PROVENTIL HFA, VENTOLIN HFA, PROAIR HFA) -PRN     therapeutic multivitamin (THERAGRAN) tablet, Take 1 Tablet by mouth daily. , Disp: , Rfl:     L. acidophilus-L. rhamnosus 15 billion cell cap, Take  by mouth daily. , Disp: , Rfl:     ubidecarenone/vitamin E mixed (COQ10  PO), Take  by mouth., Disp: , Rfl:        Date Last Reviewed:  5/13/22   Number of Personal Factors/Comorbidities that affect the Plan of Care: 1-2: MODERATE COMPLEXITY   EXAMINATION:   Observation/Orthostatic Postural Assessment: Forward head with excessive lower cervical flexion/upper cervical extension, posterior pelvic tilt; remarkable B scapular protraction, right scapular elevation and winging. Soft tissue shortening of anterior chest  Palpation:          Tender over right subscap, tight B pect minor. Stiff and restricted right C5-6, C6-7 and T1. Tight right shoulder flexion with motion coming mostly from scapula  ROM:          R shoulder flexion 110 deg        Lower cervical ext: unable to assume neutral, B cervical rotation 25%  Strength:core 3+ to 4-/5. RUE Strength  R Shoulder Flexion: 4  R Shoulder Horizontal ABduction: 4  R Shoulder Internal Rotation:  (subscap 4-)  R Shoulder External Rotation: 5 (fatigues quickly)              Body Structures Involved:  1. Nerves  2. Joints  3. Muscles  4. Ligaments Body Functions Affected:  1. Sensory/Pain  2. Neuromusculoskeletal  3. Movement Related Activities and Participation Affected:  1. General Tasks and Demands  2. Communication  3. Self Care  4.  Domestic Life  5. job duties   Number of elements (examined above) that affect the Plan of Care: 3: MODERATE COMPLEXITY   CLINICAL PRESENTATION:   Presentation: Evolving clinical presentation with changing clinical characteristics: MODERATE COMPLEXITY   CLINICAL DECISION MAKING:   Use of outcome tool(s) and clinical judgement create a POC that gives a: Questionable prediction of patient's progress: MODERATE COMPLEXITY

## 2022-06-10 ENCOUNTER — TELEPHONE (OUTPATIENT)
Dept: ORTHOPEDIC SURGERY | Age: 62
End: 2022-06-10

## 2022-06-10 DIAGNOSIS — M54.2 CERVICALGIA: Primary | ICD-10-CM

## 2022-06-10 NOTE — LETTER
[address] [address]  Phone: [phone]  Fax: [phone]    Kierra Avendano MD        June 13, 2022      ***      Sincerely,        Kierra Avendano MD

## 2022-06-10 NOTE — LETTER
[address] [address]  Phone: [phone]  Fax: [phone]    Karen Head MD        June 13, 2022      Mr. Ava Slade is currently being treating for pain of the cervical spine here at [address] [place] [address]. From a medical standpoint I do not see any contraindications on why he cannot receive massage therapy for the cervical spine. I think this could actually help benefit the patient. Please call if you have any questions or concerns.        Sincerely,        Karen Head MD

## 2022-06-13 RX ORDER — PREDNISONE 10 MG/1
TABLET ORAL
Qty: 21 TABLET | Refills: 0 | Status: SHIPPED | OUTPATIENT
Start: 2022-06-13 | End: 2022-06-15 | Stop reason: ALTCHOICE

## 2022-06-15 ENCOUNTER — OFFICE VISIT (OUTPATIENT)
Dept: INTERNAL MEDICINE CLINIC | Facility: CLINIC | Age: 62
End: 2022-06-15
Payer: COMMERCIAL

## 2022-06-15 VITALS
SYSTOLIC BLOOD PRESSURE: 120 MMHG | HEIGHT: 72 IN | DIASTOLIC BLOOD PRESSURE: 70 MMHG | HEART RATE: 88 BPM | RESPIRATION RATE: 17 BRPM | OXYGEN SATURATION: 98 % | BODY MASS INDEX: 25.47 KG/M2 | WEIGHT: 188 LBS

## 2022-06-15 DIAGNOSIS — E78.2 MIXED HYPERLIPIDEMIA: Primary | ICD-10-CM

## 2022-06-15 DIAGNOSIS — R73.01 IFG (IMPAIRED FASTING GLUCOSE): ICD-10-CM

## 2022-06-15 PROBLEM — Z12.5 SPECIAL SCREENING FOR MALIGNANT NEOPLASM OF PROSTATE: Status: ACTIVE | Noted: 2022-06-15

## 2022-06-15 PROCEDURE — 99214 OFFICE O/P EST MOD 30 MIN: CPT | Performed by: FAMILY MEDICINE

## 2022-06-15 ASSESSMENT — PATIENT HEALTH QUESTIONNAIRE - PHQ9
1. LITTLE INTEREST OR PLEASURE IN DOING THINGS: 0
SUM OF ALL RESPONSES TO PHQ QUESTIONS 1-9: 0
2. FEELING DOWN, DEPRESSED OR HOPELESS: 0
SUM OF ALL RESPONSES TO PHQ9 QUESTIONS 1 & 2: 0
SUM OF ALL RESPONSES TO PHQ QUESTIONS 1-9: 0

## 2022-06-15 NOTE — PROGRESS NOTES
Babita Menjivar DO  Diplomate of the [employer] of [address] [place] [address] Internal Medicine      Leola Bates (: 1960) is a 62 y.o. male, here for evaluation of the following chief complaint(s):  Cholesterol Problem       ASSESSMENT/PLAN:  1. Mixed hyperlipidemia  -     CBC with Auto Differential; Future  -     Basic Metabolic Panel; Future  -     Hepatic Function Panel; Future  -     Lipid Panel; Future  2. IFG (impaired fasting glucose)  -     Hemoglobin A1C; Future     -Will check labs as ordered.  -Encourage healthy eating and exercise as able. -Will recheck A1c, continue to periodically monitor. Encourage healthy eating/exercise. SUBJECTIVE/OBJECTIVE:  HPI:  -Here for follow up on lipids. He denies chest pain, palpitations, exertional pain or pressure. Not currently taking medication. -IFG: He denies any symptoms of diabetes. Has not had any symptoms of polyuria, polydipsia, or hypoglycemia. ROS negative except as noted above today. Social History     Tobacco Use    Smoking status: Never Smoker    Smokeless tobacco: Never Used   Substance Use Topics    Alcohol use: No    Drug use: No     Vitals:    06/15/22 1357   BP: 120/70   Site: Left Upper Arm   Position: Sitting   Pulse: 88   Resp: 17   SpO2: 98%   Weight: 188 lb (85.3 kg)   Height: 5' 11.5\" (1.816 m)      Body mass index is 25.86 kg/m². Physical Exam  Vitals reviewed. Cardiovascular:      Rate and Rhythm: Normal rate and regular rhythm. Pulmonary:      Effort: Pulmonary effort is normal.      Breath sounds: Normal breath sounds. Skin:     General: Skin is warm and dry. Neurological:      Mental Status: He is alert. An electronic signature was used to authenticate this note.   aBbita Menjivar DO

## 2022-06-16 LAB
ALBUMIN SERPL-MCNC: 4.3 G/DL (ref 3.2–4.6)
ALBUMIN/GLOB SERPL: 1.4 {RATIO} (ref 1.2–3.5)
ALP SERPL-CCNC: 117 U/L (ref 50–136)
ALT SERPL-CCNC: 31 U/L (ref 12–65)
ANION GAP SERPL CALC-SCNC: 7 MMOL/L (ref 7–16)
AST SERPL-CCNC: 23 U/L (ref 15–37)
BASOPHILS # BLD: 0.1 K/UL (ref 0–0.2)
BASOPHILS NFR BLD: 1 % (ref 0–2)
BILIRUB DIRECT SERPL-MCNC: 0.1 MG/DL
BILIRUB SERPL-MCNC: 0.5 MG/DL (ref 0.2–1.1)
BUN SERPL-MCNC: 29 MG/DL (ref 8–23)
CALCIUM SERPL-MCNC: 9.4 MG/DL (ref 8.3–10.4)
CHLORIDE SERPL-SCNC: 106 MMOL/L (ref 98–107)
CHOLEST SERPL-MCNC: 201 MG/DL
CO2 SERPL-SCNC: 26 MMOL/L (ref 21–32)
CREAT SERPL-MCNC: 1 MG/DL (ref 0.8–1.5)
DIFFERENTIAL METHOD BLD: NORMAL
EOSINOPHIL # BLD: 0 K/UL (ref 0–0.8)
EOSINOPHIL NFR BLD: 1 % (ref 0.5–7.8)
ERYTHROCYTE [DISTWIDTH] IN BLOOD BY AUTOMATED COUNT: 13 % (ref 11.9–14.6)
EST. AVERAGE GLUCOSE BLD GHB EST-MCNC: 120 MG/DL
GLOBULIN SER CALC-MCNC: 3.1 G/DL (ref 2.3–3.5)
GLUCOSE SERPL-MCNC: 163 MG/DL (ref 65–100)
HBA1C MFR BLD: 5.8 % (ref 4.2–6.3)
HCT VFR BLD AUTO: 44.3 % (ref 41.1–50.3)
HDLC SERPL-MCNC: 62 MG/DL (ref 40–60)
HDLC SERPL: 3.2 {RATIO}
HGB BLD-MCNC: 14.8 G/DL (ref 13.6–17.2)
IMM GRANULOCYTES # BLD AUTO: 0 K/UL (ref 0–0.5)
IMM GRANULOCYTES NFR BLD AUTO: 0 % (ref 0–5)
LDLC SERPL CALC-MCNC: 123.6 MG/DL
LYMPHOCYTES # BLD: 1 K/UL (ref 0.5–4.6)
LYMPHOCYTES NFR BLD: 19 % (ref 13–44)
MCH RBC QN AUTO: 31.3 PG (ref 26.1–32.9)
MCHC RBC AUTO-ENTMCNC: 33.4 G/DL (ref 31.4–35)
MCV RBC AUTO: 93.7 FL (ref 79.6–97.8)
MONOCYTES # BLD: 0.4 K/UL (ref 0.1–1.3)
MONOCYTES NFR BLD: 8 % (ref 4–12)
NEUTS SEG # BLD: 3.7 K/UL (ref 1.7–8.2)
NEUTS SEG NFR BLD: 71 % (ref 43–78)
NRBC # BLD: 0 K/UL (ref 0–0.2)
PLATELET # BLD AUTO: 268 K/UL (ref 150–450)
PMV BLD AUTO: 10.6 FL (ref 9.4–12.3)
POTASSIUM SERPL-SCNC: 4.1 MMOL/L (ref 3.5–5.1)
PROT SERPL-MCNC: 7.4 G/DL (ref 6.3–8.2)
RBC # BLD AUTO: 4.73 M/UL (ref 4.23–5.6)
SODIUM SERPL-SCNC: 139 MMOL/L (ref 138–145)
TRIGL SERPL-MCNC: 77 MG/DL (ref 35–150)
VLDLC SERPL CALC-MCNC: 15.4 MG/DL (ref 6–23)
WBC # BLD AUTO: 5.2 K/UL (ref 4.3–11.1)

## 2022-06-30 ENCOUNTER — COMMUNITY OUTREACH (OUTPATIENT)
Dept: INTERNAL MEDICINE CLINIC | Facility: CLINIC | Age: 62
End: 2022-06-30

## 2022-06-30 NOTE — PROGRESS NOTES
Patient's HM shows they are overdue for colonoscopy. [facility]FUSD and  files searched with success. Results attached to order and HM updated  Note added to upcoming appointment to discuss Care Gap.

## 2022-07-15 PROBLEM — Z12.5 SPECIAL SCREENING FOR MALIGNANT NEOPLASM OF PROSTATE: Status: RESOLVED | Noted: 2022-06-15 | Resolved: 2022-07-15

## 2022-08-19 ENCOUNTER — COMMUNITY OUTREACH (OUTPATIENT)
Dept: INTERNAL MEDICINE CLINIC | Facility: CLINIC | Age: 62
End: 2022-08-19

## 2022-08-19 NOTE — PROGRESS NOTES
Chart audit performed on CRC topic.  order/report and frequency are up to date.  updated w/most recent colonoscopy from 2018.

## 2022-09-14 ENCOUNTER — TELEPHONE (OUTPATIENT)
Dept: ORTHOPEDIC SURGERY | Age: 62
End: 2022-09-14

## 2022-09-14 ENCOUNTER — OFFICE VISIT (OUTPATIENT)
Dept: ORTHOPEDIC SURGERY | Age: 62
End: 2022-09-14
Payer: COMMERCIAL

## 2022-09-14 ENCOUNTER — HOSPITAL ENCOUNTER (OUTPATIENT)
Dept: ULTRASOUND IMAGING | Age: 62
Discharge: HOME | End: 2022-09-17
Payer: COMMERCIAL

## 2022-09-14 VITALS — HEIGHT: 72 IN | WEIGHT: 195 LBS | BODY MASS INDEX: 26.41 KG/M2

## 2022-09-14 DIAGNOSIS — L03.115 CELLULITIS OF RIGHT LOWER EXTREMITY: ICD-10-CM

## 2022-09-14 DIAGNOSIS — M79.604 RIGHT LEG PAIN: Primary | ICD-10-CM

## 2022-09-14 DIAGNOSIS — R60.0 LEG EDEMA, RIGHT: ICD-10-CM

## 2022-09-14 DIAGNOSIS — M79.604 RIGHT LEG PAIN: ICD-10-CM

## 2022-09-14 DIAGNOSIS — S89.91XA INJURY OF RIGHT LOWER EXTREMITY, INITIAL ENCOUNTER: ICD-10-CM

## 2022-09-14 PROCEDURE — 99214 OFFICE O/P EST MOD 30 MIN: CPT | Performed by: ORTHOPAEDIC SURGERY

## 2022-09-14 PROCEDURE — 93971 EXTREMITY STUDY: CPT

## 2022-09-14 RX ORDER — CEPHALEXIN 500 MG/1
CAPSULE ORAL
Qty: 56 CAPSULE | Refills: 2 | Status: SHIPPED | OUTPATIENT
Start: 2022-09-14

## 2022-09-14 RX ORDER — METRONIDAZOLE 500 MG/1
500 TABLET ORAL 2 TIMES DAILY
Qty: 28 TABLET | Refills: 0 | Status: SHIPPED | OUTPATIENT
Start: 2022-09-14 | End: 2022-09-28

## 2022-09-14 NOTE — PROGRESS NOTES
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 He has never used smokeless tobacco.     Physical Examination:  Patient appears to be alert and oriented with acceptable appearance. No obvious distress or SOB  CV: appears to have acceptable vascular color and capillary refill  Neuro: appears to have mostly intact light touch sensation   Skin: Right anterior shin with 3 eschars; below eschars, petechial redness  MS: Standing: Plantigrade: Gait full  Right = no area of pain; known knee arthritis but no pain; no tibia, compartment, calf, ankle, foot pain  Right = full 5/5 strength; no instability or crepitance    XR: Right side: Standing AP lateral mortise ankle taken today with older appearing changes in the anterolateral ankle to syndesmosis; calcified os peroneal  XR Impression:  As above      XR: Right side: AP knee with AP lateral tibia/fibula with tricompartmental knee arthritis; no definite tibia fracture noted; anterior tibial soft tissue shadows  XR Impression:  As above      Reviewed Test/Records/Documents:  05/05/2022: Dr. Juan Carlos Martinez treated: Right C6-C7 radiculopathy      Assessment:    Right shin original crush injury, second blunt injury  Right posttraumatic shin edema, wounds, cellulitis    Plan:   The patient and I discussed the above assessment. We explored treatment options.      He reports that his original crush injury had an opening that quickly closed with a scab and no surrounding redness  He reports after his second injury, the original injury burst open and was draining but then quickly scabbed  He did not develop swelling and redness until ~ 2 days ago  He has no signs to suggest an abscess  He has no signs suggestive of compartment syndrome    Advanced medical imaging: Today: Duplex lower extremity ultrasound rule out DVT [read as negative for DVT]  Future possible: Left tibia MRI scan  DME: He has no pain and does not feel he needs a boot  We discussed leg care and protection     Medication - OTC meds prn: Prescribed:  Keflex 500 m p.o. twice daily: 14 days: 2 refills  Flagyl 500 m p.o. daily: 14 days: 2 refills  With his list of allergies, he reports being able to take Keflex without reaction or incident    Surgical discussion: Potential future surgery for debridement if the area will not clear quickly  Follow up: Friday  Work status: He feels he can do regular work  History and discussion of management with: The patient    This note was created using Dragon voice recognition software which may result in errors of speech and spelling recognition and word/phrase syntax errors.

## 2022-09-14 NOTE — TELEPHONE ENCOUNTER
[facility] Radiology called with STAT report for Ultrasound  Pt is Negative.

## 2022-09-16 ENCOUNTER — OFFICE VISIT (OUTPATIENT)
Dept: ORTHOPEDIC SURGERY | Age: 62
End: 2022-09-16
Payer: COMMERCIAL

## 2022-09-16 DIAGNOSIS — L03.115 CELLULITIS OF RIGHT LOWER EXTREMITY: Primary | ICD-10-CM

## 2022-09-16 PROCEDURE — 99213 OFFICE O/P EST LOW 20 MIN: CPT | Performed by: ORTHOPAEDIC SURGERY

## 2022-09-16 NOTE — PROGRESS NOTES
Name: Durene Phoenix  YOB: 1960  Gender: male  MRN: [id number]    09/14/2022: Initial visit with me for: Right shin injury  09/16/2022: He returns feeling much better. 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 He has never used smokeless tobacco.     Physical Examination:  Patient appears to be alert and oriented with acceptable appearance.   No obvious distress or SOB  CV: appears to have acceptable vascular color and capillary refill  Neuro: appears to have mostly intact light touch sensation   Skin: Right anterior shin with 3 resolving eschars; no drainage; below eschars, much less petechial redness  MS: Standing: Plantigrade: Gait full  Right = no area of pain; known knee arthritis but no pain; no tibia, compartment, calf, ankle, foot pain  Right = full 5/5 strength; no instability or crepitance    XR: Right side: Standing AP lateral mortise ankle taken 09/14/2022 with older appearing changes in the anterolateral ankle to syndesmosis; calcified os peroneal  XR Impression:  As above      XR: Right side: AP knee with AP lateral tibia/fibula 09/14/2022 with tricompartmental knee arthritis; no definite tibia fracture noted; anterior tibial soft tissue shadows  XR Impression:  As above      Reviewed Test/Records/Documents:  05/05/2022: Dr. Ade Madden treated: Right C6-C7 radiculopathy    09/14/2022: Right lower extremity duplex ultrasound: Radiology impression: Negative for DVT    Assessment:    Right shin original crush injury, second blunt injury  Right posttraumatic shin edema, wounds, cellulitis    Plan:   The patient and I discussed the above assessment. We explored treatment options. He is much better. His wife Angelica So is a nurse and will continue to monitor. He has no signs to suggest an abscess  He has no signs suggestive of compartment syndrome  His duplex ultrasound was negative for DVT  Advanced medical imaging: He has no indication today for MRI scan  He understands importance of continued protection  Medication - OTC meds prn: Until all the redness resolves, I recommend he continue prescribed:  Keflex 500 m p.o. twice daily: 14 days: 2 refills  Flagyl 500 m p.o. daily: 14 days: 2 refills     Surgical discussion: No indication for surgery  Follow up: At the discretion of his wife  Work status: He feels he can do regular work  History and discussion of management with: The patient    This note was created using Dragon voice recognition software which may result in errors of speech and spelling recognition and word/phrase syntax errors.

## 2022-11-03 DIAGNOSIS — M54.2 CERVICALGIA: Primary | ICD-10-CM

## 2022-11-03 RX ORDER — METHYLPREDNISOLONE 4 MG/1
TABLET ORAL
Qty: 1 KIT | Refills: 0 | Status: SHIPPED | OUTPATIENT
Start: 2022-11-03

## 2022-12-27 ENCOUNTER — NURSE ONLY (OUTPATIENT)
Dept: INTERNAL MEDICINE CLINIC | Facility: CLINIC | Age: 62
End: 2022-12-27
Payer: COMMERCIAL

## 2022-12-27 VITALS
TEMPERATURE: 99.7 F | SYSTOLIC BLOOD PRESSURE: 112 MMHG | OXYGEN SATURATION: 97 % | DIASTOLIC BLOOD PRESSURE: 60 MMHG | HEART RATE: 109 BPM

## 2022-12-27 DIAGNOSIS — U07.1 COVID-19: Primary | ICD-10-CM

## 2022-12-27 DIAGNOSIS — R50.9 FEVER, UNSPECIFIED FEVER CAUSE: Primary | ICD-10-CM

## 2022-12-27 DIAGNOSIS — R52 GENERALIZED BODY ACHES: ICD-10-CM

## 2022-12-27 DIAGNOSIS — R51.9 ACUTE NONINTRACTABLE HEADACHE, UNSPECIFIED HEADACHE TYPE: ICD-10-CM

## 2022-12-27 LAB
EXP DATE SOLUTION: ABNORMAL
EXP DATE SWAB: ABNORMAL
EXPIRATION DATE: ABNORMAL
GROUP A STREP ANTIGEN, POC: NEGATIVE
LOT NUMBER POC: ABNORMAL
LOT NUMBER SOLUTION: ABNORMAL
LOT NUMBER SWAB: ABNORMAL
QUICKVUE INFLUENZA TEST: NEGATIVE
SARS-COV-2 RNA, POC: POSITIVE
VALID INTERNAL CONTROL, POC: YES
VALID INTERNAL CONTROL, POC: YES

## 2022-12-27 PROCEDURE — 87880 STREP A ASSAY W/OPTIC: CPT | Performed by: FAMILY MEDICINE

## 2022-12-27 PROCEDURE — 87804 INFLUENZA ASSAY W/OPTIC: CPT | Performed by: FAMILY MEDICINE

## 2022-12-27 PROCEDURE — 87635 SARS-COV-2 COVID-19 AMP PRB: CPT | Performed by: FAMILY MEDICINE

## 2022-12-27 NOTE — PROGRESS NOTES
On Call Note  Date: 2022  Constantino Akins  : [id number]  PCP: Kaitlin Choi DO  Chief  Complaint: Pt with c/o fever >100/sore throat/HA/Sinus pressure x 1-2 days. COVID + in office. Treatment/Recommedations:   Paxlovid is an antiviral that can potentially decrease your risk of developing severe disease. However, there are potential side effects such as altered taste/GI effects/etc.  There is also a rare risk of rebound---where you can test positive 2-8 days after symptoms resolve with recurrence of symptoms (usually mild). However, as of yet, there have not been serious complications due to this rebound effect. Pt aware and would like Rx. Orders Placed This Encounter    nirmatrelvir/ritonavir (PAXLOVID) 20 x 150 MG & 10 x 100MG TBPK     Sig: Take 3 tablets (two 150 mg nirmatrelvir and one 100 mg ritonavir tablets) by mouth every 12 hours for 5 days. Dispense:  30 tablet     Refill:  0     Order Specific Question:   Does this patient qualify for COVID-19 antIviral therapy based on criteria for treatment? Answer:    Yes

## 2023-05-11 ENCOUNTER — OFFICE VISIT (OUTPATIENT)
Dept: ORTHOPEDIC SURGERY | Age: 63
End: 2023-05-11

## 2023-05-11 DIAGNOSIS — M54.2 CERVICALGIA: Primary | ICD-10-CM

## 2023-05-11 DIAGNOSIS — M54.12 CERVICAL RADICULOPATHY: ICD-10-CM

## 2023-05-11 RX ORDER — GABAPENTIN 100 MG/1
100 CAPSULE ORAL 3 TIMES DAILY PRN
Qty: 30 CAPSULE | Refills: 2 | Status: SHIPPED | OUTPATIENT
Start: 2023-05-11 | End: 2023-06-10

## 2023-05-11 RX ORDER — METHYLPREDNISOLONE 4 MG/1
TABLET ORAL
Qty: 1 KIT | Refills: 1 | Status: SHIPPED | OUTPATIENT
Start: 2023-05-11 | End: 2023-05-17

## 2023-05-11 NOTE — PROGRESS NOTES
Date:  05/11/23     HPI:  I am seeing Richi Qureshi in evalution/folowup. I last saw him a year ago. About 10 years ago he had pain in the extensor forearm on the right and Gilbert aspect of the right hand, this responded to physical therapy. About 3 years ago he had similar problems on the right with lower cervical paraspinal pain and medial right forearm pain. That responded to physical therapy, trigger point injection did not help. He has symptoms that wax and wane, may episodically start having symptoms. That can respond to oral steroids. He does have a lot of itching in the posterior cervical area on the right but this did not respond to a Salonpas or lidocaine patch. MR imaging of cervical spine has revealed moderate-severe neuroforaminal narrowing on the right at the C5-C6 level. He has required oral steroids in June as well as November of last year. The current time he is doing pretty well. He also has some issues with vertigo from time to time that is being addressed by his primary provider or has been getting some exercises from physical therapy. ROS: As above    PE: Cooperative. Good strength in the upper and lower extremities. No pathologically heightened reflexes. He ambulates without assistance. No ataxia in the upper extremities. No significant cervical spine tenderness. Assessment/Plan:  PREDOMINANTLY MUSCULOSKELETAL CERVICAL  SPINE PAIN. Doing pretty well at this time. He does have radicular symptoms in the right arm that could be seen with his neuroforaminal stenosis. Follow along, he has refills of Medrol Dosepaks as required. He has requested to try some gabapentin from time to time and I have no problem with that. I will call in 100 mg tablets he can take up to 3 times a day as needed. Discussed side effects include the rare chance of depression or suicidal ideation. We do not need to reimage.   I will work on trying to find him a home traction unit he

## 2024-06-04 ENCOUNTER — OFFICE VISIT (OUTPATIENT)
Dept: ORTHOPEDIC SURGERY | Age: 64
End: 2024-06-04
Payer: COMMERCIAL

## 2024-06-04 DIAGNOSIS — M54.12 CERVICAL RADICULOPATHY: ICD-10-CM

## 2024-06-04 DIAGNOSIS — M54.2 CERVICALGIA: Primary | ICD-10-CM

## 2024-06-04 PROCEDURE — 99213 OFFICE O/P EST LOW 20 MIN: CPT | Performed by: PHYSICAL MEDICINE & REHABILITATION

## 2024-06-04 RX ORDER — GABAPENTIN 100 MG/1
100 CAPSULE ORAL 3 TIMES DAILY
Qty: 90 CAPSULE | Refills: 4 | Status: SHIPPED | OUTPATIENT
Start: 2024-06-04 | End: 2024-11-01

## 2024-06-04 NOTE — PROGRESS NOTES
Date:  06/04/24     HPI:  I am seeing Jaron Lopez in evalution/folowup.  I saw him most recently just over a year ago.  About 11 years ago he had pain in the extensor forearm, possibly on the left.  We thought it was the right but tells me in retrospect it might be the left.  Regardless that did well with physical therapy then about 4 years ago the symptoms definitely changed or just remained on the right side.  He had lower cervical paraspinal pain and medial right forearm pain.  This respond to physical therapy with traction, trigger point injection did not offer much benefit.    He had MR imaging of cervical spine via moderate-severe neuroforaminal narrowing on the right primarily at the C5-C6 level.  That is felt to be the main cause of his issues.    He has symptoms that wax and wane, can just move his neck and shoulder around things get better.  There is no history of progressive numbness, tingling or weakness.  This has responded to oral steroids in the past but he has not required those in the last year.  At one time he tried lidocaine patches for some itching pain in the posterior cervical area on the right those did not seem to help.  At the current time he does not feel he really needs to do other than just follow along..    ROS: I am unaware of any new medical problems in the interim    PE: Alert and cooperative.  Good strength in upper extremities.  No lateralizing sensory findings.  No cervical spine tenderness.  No accentuated reflexes.  No Tromner reflexes.  Ambulates without assistance.    Assessment/Plan:       PREDOMINANTLY MUSCULOSKELETAL CERVICAL AND  SPINE PAIN.  Right upper extremity cervical radiculopathy.  Neuroforaminal narrowing, doing well with conservative measures.  Will continue the current plan.  I think he is interested in more aggressively pursuing a home traction unit.  I will work on trying to get one for him.  We do not need to reimage.  Of note we did discuss trying

## 2024-06-17 ENCOUNTER — OFFICE VISIT (OUTPATIENT)
Dept: INTERNAL MEDICINE CLINIC | Facility: CLINIC | Age: 64
End: 2024-06-17
Payer: COMMERCIAL

## 2024-06-17 VITALS
HEART RATE: 70 BPM | HEIGHT: 72 IN | BODY MASS INDEX: 25.73 KG/M2 | DIASTOLIC BLOOD PRESSURE: 80 MMHG | WEIGHT: 190 LBS | OXYGEN SATURATION: 98 % | SYSTOLIC BLOOD PRESSURE: 130 MMHG

## 2024-06-17 DIAGNOSIS — R73.01 IFG (IMPAIRED FASTING GLUCOSE): ICD-10-CM

## 2024-06-17 DIAGNOSIS — E78.2 MIXED HYPERLIPIDEMIA: ICD-10-CM

## 2024-06-17 DIAGNOSIS — Z12.5 PROSTATE CANCER SCREENING: ICD-10-CM

## 2024-06-17 DIAGNOSIS — M17.11 PRIMARY OSTEOARTHRITIS OF RIGHT KNEE: ICD-10-CM

## 2024-06-17 DIAGNOSIS — Z00.00 ROUTINE GENERAL MEDICAL EXAMINATION AT A HEALTH CARE FACILITY: Primary | ICD-10-CM

## 2024-06-17 LAB
ALBUMIN SERPL-MCNC: 4.5 G/DL (ref 3.2–4.6)
ALBUMIN/GLOB SERPL: 1.7 (ref 1–1.9)
ALP SERPL-CCNC: 110 U/L (ref 40–129)
ALT SERPL-CCNC: 24 U/L (ref 12–65)
ANION GAP SERPL CALC-SCNC: 12 MMOL/L (ref 9–18)
AST SERPL-CCNC: 29 U/L (ref 15–37)
BILIRUB DIRECT SERPL-MCNC: <0.2 MG/DL (ref 0–0.4)
BILIRUB SERPL-MCNC: 0.5 MG/DL (ref 0–1.2)
BUN SERPL-MCNC: 23 MG/DL (ref 8–23)
CALCIUM SERPL-MCNC: 9.4 MG/DL (ref 8.8–10.2)
CHLORIDE SERPL-SCNC: 101 MMOL/L (ref 98–107)
CHOLEST SERPL-MCNC: 236 MG/DL (ref 0–200)
CO2 SERPL-SCNC: 25 MMOL/L (ref 20–28)
CREAT SERPL-MCNC: 0.9 MG/DL (ref 0.8–1.3)
ERYTHROCYTE [DISTWIDTH] IN BLOOD BY AUTOMATED COUNT: 12.4 % (ref 11.9–14.6)
EST. AVERAGE GLUCOSE BLD GHB EST-MCNC: 119 MG/DL
GLOBULIN SER CALC-MCNC: 2.6 G/DL (ref 2.3–3.5)
GLUCOSE SERPL-MCNC: 101 MG/DL (ref 70–99)
HBA1C MFR BLD: 5.8 % (ref 0–5.6)
HCT VFR BLD AUTO: 46.6 % (ref 41.1–50.3)
HDLC SERPL-MCNC: 64 MG/DL (ref 40–60)
HDLC SERPL: 3.7 (ref 0–5)
HGB BLD-MCNC: 15.3 G/DL (ref 13.6–17.2)
LDLC SERPL CALC-MCNC: 157 MG/DL (ref 0–100)
MCH RBC QN AUTO: 31 PG (ref 26.1–32.9)
MCHC RBC AUTO-ENTMCNC: 32.8 G/DL (ref 31.4–35)
MCV RBC AUTO: 94.5 FL (ref 82–102)
NRBC # BLD: 0 K/UL (ref 0–0.2)
PLATELET # BLD AUTO: 244 K/UL (ref 150–450)
PMV BLD AUTO: 10.4 FL (ref 9.4–12.3)
POTASSIUM SERPL-SCNC: 4.4 MMOL/L (ref 3.5–5.1)
PROT SERPL-MCNC: 7.2 G/DL (ref 6.3–8.2)
PSA SERPL-MCNC: 3.1 NG/ML (ref 0–4)
RBC # BLD AUTO: 4.93 M/UL (ref 4.23–5.6)
SODIUM SERPL-SCNC: 138 MMOL/L (ref 136–145)
TRIGL SERPL-MCNC: 77 MG/DL (ref 0–150)
VLDLC SERPL CALC-MCNC: 15 MG/DL (ref 6–23)
WBC # BLD AUTO: 4.5 K/UL (ref 4.3–11.1)

## 2024-06-17 PROCEDURE — 99396 PREV VISIT EST AGE 40-64: CPT | Performed by: FAMILY MEDICINE

## 2024-06-17 RX ORDER — CELECOXIB 200 MG/1
200 CAPSULE ORAL 2 TIMES DAILY PRN
Qty: 60 CAPSULE | Refills: 11 | Status: SHIPPED | OUTPATIENT
Start: 2024-06-17

## 2024-06-17 SDOH — ECONOMIC STABILITY: FOOD INSECURITY: WITHIN THE PAST 12 MONTHS, THE FOOD YOU BOUGHT JUST DIDN'T LAST AND YOU DIDN'T HAVE MONEY TO GET MORE.: NEVER TRUE

## 2024-06-17 SDOH — ECONOMIC STABILITY: HOUSING INSECURITY
IN THE LAST 12 MONTHS, WAS THERE A TIME WHEN YOU DID NOT HAVE A STEADY PLACE TO SLEEP OR SLEPT IN A SHELTER (INCLUDING NOW)?: NO

## 2024-06-17 SDOH — ECONOMIC STABILITY: INCOME INSECURITY: HOW HARD IS IT FOR YOU TO PAY FOR THE VERY BASICS LIKE FOOD, HOUSING, MEDICAL CARE, AND HEATING?: NOT HARD AT ALL

## 2024-06-17 SDOH — ECONOMIC STABILITY: FOOD INSECURITY: WITHIN THE PAST 12 MONTHS, YOU WORRIED THAT YOUR FOOD WOULD RUN OUT BEFORE YOU GOT MONEY TO BUY MORE.: NEVER TRUE

## 2024-06-17 ASSESSMENT — PATIENT HEALTH QUESTIONNAIRE - PHQ9
SUM OF ALL RESPONSES TO PHQ QUESTIONS 1-9: 0
SUM OF ALL RESPONSES TO PHQ QUESTIONS 1-9: 0
SUM OF ALL RESPONSES TO PHQ9 QUESTIONS 1 & 2: 0
1. LITTLE INTEREST OR PLEASURE IN DOING THINGS: NOT AT ALL
SUM OF ALL RESPONSES TO PHQ QUESTIONS 1-9: 0
SUM OF ALL RESPONSES TO PHQ QUESTIONS 1-9: 0
2. FEELING DOWN, DEPRESSED OR HOPELESS: NOT AT ALL

## 2024-06-17 NOTE — PROGRESS NOTES
Erlin Stockton DO  Diplomate of the [employer]  [facility]      Jaron Lopez (: 1960) is a 63 y.o. male, here for evaluation of the following chief complaint(s):  Annual Exam       ASSESSMENT/PLAN:  1. Routine general medical examination at a health care facility  2. Primary osteoarthritis of right knee  -     celecoxib (CELEBREX) 200 MG capsule; Take 1 capsule by mouth 2 times daily as needed for Pain, Disp-60 capsule, R-11Normal  3. IFG (impaired fasting glucose)  -     Hemoglobin A1C; Future  4. Mixed hyperlipidemia  -     Basic Metabolic Panel; Future  -     Hepatic Function Panel; Future  -     Lipid Panel; Future  -     CBC; Future  5. Prostate cancer screening  -     PSA Screening; Future     Encourage healthy eating/exercise as able.  Will continue with celebrex prn for joint pain.  Will recheck A1c, continue to periodically monitor.  Encourage healthy eating/exercise.  Recheck lipid levels.  Pros and cons of PSA testing for prostate screening were discussed.  The patient does wish to proceed.          SUBJECTIVE/OBJECTIVE:  HPI:  Comes in today for routine physical.  Continues to have chronic knee osteoarthritis.  He does get relief with Celebrex.  Tolerates well without significant adverse effects.  Does have a history of IFG, but has no signs of diabetes.  ROS negative except as noted above today.    Social History     Tobacco Use    Smoking status: Never    Smokeless tobacco: Never   Substance Use Topics    Alcohol use: No    Drug use: No     Vitals:    24 1318   BP: 130/80   Site: Left Upper Arm   Position: Sitting   Pulse: 70   SpO2: 98%   Weight: 86.2 kg (190 lb)   Height: 1.816 m (5' 11.5\")      Body mass index is 26.13 kg/m².  Physical Exam  Vitals reviewed.   Constitutional:       General: He is not in acute distress.     Appearance: He is not ill-appearing.   HENT:      Head: Normocephalic.   Eyes:      Extraocular Movements:

## 2024-06-24 ENCOUNTER — PATIENT MESSAGE (OUTPATIENT)
Dept: INTERNAL MEDICINE CLINIC | Facility: CLINIC | Age: 64
End: 2024-06-24

## 2024-06-24 DIAGNOSIS — E78.2 MIXED HYPERLIPIDEMIA: Primary | ICD-10-CM

## 2024-06-25 DIAGNOSIS — E78.2 MIXED HYPERLIPIDEMIA: Primary | ICD-10-CM

## 2024-06-25 RX ORDER — ATORVASTATIN CALCIUM 20 MG/1
20 TABLET, FILM COATED ORAL DAILY
Qty: 90 TABLET | Refills: 1 | Status: SHIPPED | OUTPATIENT
Start: 2024-06-25

## 2024-06-25 NOTE — TELEPHONE ENCOUNTER
From: Jaron Lopez  To: Dr. Erlin BIRCH Brothers  Sent: 6/24/2024 12:50 PM EDT  Subject: Hepatic panel    Do I need to schedule an appointment?

## 2024-08-20 ENCOUNTER — LAB (OUTPATIENT)
Dept: INTERNAL MEDICINE CLINIC | Facility: CLINIC | Age: 64
End: 2024-08-20

## 2024-08-20 DIAGNOSIS — E78.2 MIXED HYPERLIPIDEMIA: ICD-10-CM

## 2024-08-20 LAB
ALBUMIN SERPL-MCNC: 4.2 G/DL (ref 3.2–4.6)
ALBUMIN/GLOB SERPL: 1.5 (ref 1–1.9)
ALP SERPL-CCNC: 131 U/L (ref 40–129)
ALT SERPL-CCNC: 30 U/L (ref 12–65)
AST SERPL-CCNC: 30 U/L (ref 15–37)
BILIRUB DIRECT SERPL-MCNC: <0.2 MG/DL (ref 0–0.4)
BILIRUB SERPL-MCNC: 0.4 MG/DL (ref 0–1.2)
CHOLEST SERPL-MCNC: 161 MG/DL (ref 0–200)
GLOBULIN SER CALC-MCNC: 2.8 G/DL (ref 2.3–3.5)
HDLC SERPL-MCNC: 65 MG/DL (ref 40–60)
HDLC SERPL: 2.5 (ref 0–5)
LDLC SERPL CALC-MCNC: 79 MG/DL (ref 0–100)
PROT SERPL-MCNC: 7 G/DL (ref 6.3–8.2)
TRIGL SERPL-MCNC: 85 MG/DL (ref 0–150)
VLDLC SERPL CALC-MCNC: 17 MG/DL (ref 6–23)

## 2024-08-26 DIAGNOSIS — R74.8 ELEVATED ALKALINE PHOSPHATASE LEVEL: Primary | ICD-10-CM

## 2024-08-27 ENCOUNTER — PATIENT MESSAGE (OUTPATIENT)
Dept: INTERNAL MEDICINE CLINIC | Facility: CLINIC | Age: 64
End: 2024-08-27

## 2024-11-27 ENCOUNTER — LAB (OUTPATIENT)
Dept: INTERNAL MEDICINE CLINIC | Facility: CLINIC | Age: 64
End: 2024-11-27

## 2024-11-27 DIAGNOSIS — R74.8 ELEVATED ALKALINE PHOSPHATASE LEVEL: ICD-10-CM

## 2024-11-27 LAB
ALBUMIN SERPL-MCNC: 4 G/DL (ref 3.2–4.6)
ALBUMIN/GLOB SERPL: 1.5 (ref 1–1.9)
ALP SERPL-CCNC: 146 U/L (ref 40–129)
ALT SERPL-CCNC: 57 U/L (ref 8–55)
AST SERPL-CCNC: 59 U/L (ref 15–37)
BILIRUB DIRECT SERPL-MCNC: <0.2 MG/DL (ref 0–0.4)
BILIRUB SERPL-MCNC: 0.5 MG/DL (ref 0–1.2)
GLOBULIN SER CALC-MCNC: 2.7 G/DL (ref 2.3–3.5)
PROT SERPL-MCNC: 6.7 G/DL (ref 6.3–8.2)

## 2024-12-02 DIAGNOSIS — R74.8 ELEVATED LIVER ENZYMES: Primary | ICD-10-CM

## 2024-12-02 NOTE — RESULT ENCOUNTER NOTE
Pls call and let know liver enzymes are up.  Please hold the atorvastatin and recheck hepatic function panel in one month.

## 2024-12-30 ENCOUNTER — LAB (OUTPATIENT)
Dept: INTERNAL MEDICINE CLINIC | Facility: CLINIC | Age: 64
End: 2024-12-30

## 2024-12-30 DIAGNOSIS — R74.8 ELEVATED LIVER ENZYMES: ICD-10-CM

## 2024-12-30 LAB
ALBUMIN SERPL-MCNC: 4.1 G/DL (ref 3.2–4.6)
ALBUMIN/GLOB SERPL: 1.5 (ref 1–1.9)
ALP SERPL-CCNC: 123 U/L (ref 40–129)
ALT SERPL-CCNC: 28 U/L (ref 8–55)
AST SERPL-CCNC: 29 U/L (ref 15–37)
BILIRUB DIRECT SERPL-MCNC: <0.2 MG/DL (ref 0–0.4)
BILIRUB SERPL-MCNC: 0.3 MG/DL (ref 0–1.2)
GLOBULIN SER CALC-MCNC: 2.8 G/DL (ref 2.3–3.5)
PROT SERPL-MCNC: 6.9 G/DL (ref 6.3–8.2)

## 2025-02-20 DIAGNOSIS — E78.2 MIXED HYPERLIPIDEMIA: Primary | ICD-10-CM

## 2025-03-15 ENCOUNTER — RESULTS FOLLOW-UP (OUTPATIENT)
Dept: INTERNAL MEDICINE CLINIC | Facility: CLINIC | Age: 65
End: 2025-03-15

## 2025-03-15 DIAGNOSIS — E78.2 MIXED HYPERLIPIDEMIA: Primary | ICD-10-CM

## 2025-03-15 RX ORDER — EZETIMIBE 10 MG/1
10 TABLET ORAL DAILY
Qty: 90 TABLET | Refills: 1 | Status: SHIPPED | OUTPATIENT
Start: 2025-03-15

## 2025-03-17 DIAGNOSIS — R74.8 ELEVATED LIVER ENZYMES: Primary | ICD-10-CM

## 2025-04-15 ENCOUNTER — LAB (OUTPATIENT)
Dept: INTERNAL MEDICINE CLINIC | Facility: CLINIC | Age: 65
End: 2025-04-15

## 2025-04-15 DIAGNOSIS — R74.8 ELEVATED LIVER ENZYMES: ICD-10-CM

## 2025-04-15 LAB
ALBUMIN SERPL-MCNC: 3.8 G/DL (ref 3.2–4.6)
ALBUMIN/GLOB SERPL: 1.2 (ref 1–1.9)
ALP SERPL-CCNC: 114 U/L (ref 40–129)
ALT SERPL-CCNC: 30 U/L (ref 8–55)
AST SERPL-CCNC: 34 U/L (ref 15–37)
BILIRUB DIRECT SERPL-MCNC: 0.2 MG/DL (ref 0–0.3)
BILIRUB SERPL-MCNC: 0.4 MG/DL (ref 0–1.2)
GLOBULIN SER CALC-MCNC: 3 G/DL (ref 2.3–3.5)
PROT SERPL-MCNC: 6.8 G/DL (ref 6.3–8.2)

## 2025-04-16 ENCOUNTER — RESULTS FOLLOW-UP (OUTPATIENT)
Dept: INTERNAL MEDICINE CLINIC | Facility: CLINIC | Age: 65
End: 2025-04-16

## 2025-06-18 ENCOUNTER — OFFICE VISIT (OUTPATIENT)
Dept: INTERNAL MEDICINE CLINIC | Facility: CLINIC | Age: 65
End: 2025-06-18
Payer: COMMERCIAL

## 2025-06-18 VITALS
WEIGHT: 204 LBS | HEART RATE: 70 BPM | HEIGHT: 72 IN | DIASTOLIC BLOOD PRESSURE: 60 MMHG | OXYGEN SATURATION: 98 % | BODY MASS INDEX: 27.63 KG/M2 | SYSTOLIC BLOOD PRESSURE: 110 MMHG

## 2025-06-18 DIAGNOSIS — R73.01 IFG (IMPAIRED FASTING GLUCOSE): ICD-10-CM

## 2025-06-18 DIAGNOSIS — M17.11 PRIMARY OSTEOARTHRITIS OF RIGHT KNEE: ICD-10-CM

## 2025-06-18 DIAGNOSIS — Z00.00 ROUTINE GENERAL MEDICAL EXAMINATION AT A HEALTH CARE FACILITY: Primary | ICD-10-CM

## 2025-06-18 DIAGNOSIS — B35.1 ONYCHOMYCOSIS: ICD-10-CM

## 2025-06-18 DIAGNOSIS — E78.2 MIXED HYPERLIPIDEMIA: ICD-10-CM

## 2025-06-18 DIAGNOSIS — Z12.5 PROSTATE CANCER SCREENING: ICD-10-CM

## 2025-06-18 LAB
ALBUMIN SERPL-MCNC: 3.6 G/DL (ref 3.2–4.6)
ALBUMIN/GLOB SERPL: 1.3 (ref 1–1.9)
ALP SERPL-CCNC: 121 U/L (ref 40–129)
ALT SERPL-CCNC: 30 U/L (ref 8–55)
ANION GAP SERPL CALC-SCNC: 11 MMOL/L (ref 7–16)
AST SERPL-CCNC: 30 U/L (ref 15–37)
BILIRUB DIRECT SERPL-MCNC: 0.2 MG/DL (ref 0–0.3)
BILIRUB SERPL-MCNC: 0.4 MG/DL (ref 0–1.2)
BUN SERPL-MCNC: 26 MG/DL (ref 8–23)
CALCIUM SERPL-MCNC: 8.8 MG/DL (ref 8.8–10.2)
CHLORIDE SERPL-SCNC: 104 MMOL/L (ref 98–107)
CHOLEST SERPL-MCNC: 181 MG/DL (ref 0–200)
CO2 SERPL-SCNC: 23 MMOL/L (ref 20–29)
CREAT SERPL-MCNC: 0.89 MG/DL (ref 0.8–1.3)
ERYTHROCYTE [DISTWIDTH] IN BLOOD BY AUTOMATED COUNT: 12.6 % (ref 11.9–14.6)
EST. AVERAGE GLUCOSE BLD GHB EST-MCNC: 115 MG/DL
GLOBULIN SER CALC-MCNC: 2.8 G/DL (ref 2.3–3.5)
GLUCOSE SERPL-MCNC: 193 MG/DL (ref 70–99)
HBA1C MFR BLD: 5.6 % (ref 0–5.6)
HCT VFR BLD AUTO: 38 % (ref 41.1–50.3)
HDLC SERPL-MCNC: 67 MG/DL (ref 40–60)
HDLC SERPL: 2.7 (ref 0–5)
HGB BLD-MCNC: 13.7 G/DL (ref 13.6–17.2)
LDLC SERPL CALC-MCNC: 97 MG/DL (ref 0–100)
MCH RBC QN AUTO: 32.4 PG (ref 26.1–32.9)
MCHC RBC AUTO-ENTMCNC: 36.1 G/DL (ref 31.4–35)
MCV RBC AUTO: 89.8 FL (ref 82–102)
NRBC # BLD: 0 K/UL (ref 0–0.2)
PLATELET # BLD AUTO: 197 K/UL (ref 150–450)
PMV BLD AUTO: 10.9 FL (ref 9.4–12.3)
POTASSIUM SERPL-SCNC: 4 MMOL/L (ref 3.5–5.1)
PROT SERPL-MCNC: 6.5 G/DL (ref 6.3–8.2)
PSA SERPL-MCNC: 2.3 NG/ML (ref 0–4)
RBC # BLD AUTO: 4.23 M/UL (ref 4.23–5.6)
SODIUM SERPL-SCNC: 139 MMOL/L (ref 136–145)
TRIGL SERPL-MCNC: 84 MG/DL (ref 0–150)
VLDLC SERPL CALC-MCNC: 17 MG/DL (ref 6–23)
WBC # BLD AUTO: 4.7 K/UL (ref 4.3–11.1)

## 2025-06-18 PROCEDURE — 99396 PREV VISIT EST AGE 40-64: CPT | Performed by: FAMILY MEDICINE

## 2025-06-18 RX ORDER — CELECOXIB 200 MG/1
200 CAPSULE ORAL 2 TIMES DAILY PRN
Qty: 180 CAPSULE | Refills: 3 | Status: SHIPPED | OUTPATIENT
Start: 2025-06-18

## 2025-06-18 RX ORDER — TERBINAFINE HYDROCHLORIDE 250 MG/1
250 TABLET ORAL DAILY
Qty: 84 TABLET | Refills: 0 | Status: SHIPPED | OUTPATIENT
Start: 2025-06-18 | End: 2025-09-10

## 2025-06-18 SDOH — ECONOMIC STABILITY: FOOD INSECURITY: WITHIN THE PAST 12 MONTHS, YOU WORRIED THAT YOUR FOOD WOULD RUN OUT BEFORE YOU GOT MONEY TO BUY MORE.: NEVER TRUE

## 2025-06-18 SDOH — ECONOMIC STABILITY: FOOD INSECURITY: WITHIN THE PAST 12 MONTHS, THE FOOD YOU BOUGHT JUST DIDN'T LAST AND YOU DIDN'T HAVE MONEY TO GET MORE.: NEVER TRUE

## 2025-06-18 ASSESSMENT — PATIENT HEALTH QUESTIONNAIRE - PHQ9
2. FEELING DOWN, DEPRESSED OR HOPELESS: NOT AT ALL
SUM OF ALL RESPONSES TO PHQ QUESTIONS 1-9: 0
1. LITTLE INTEREST OR PLEASURE IN DOING THINGS: NOT AT ALL

## 2025-06-18 NOTE — PROGRESS NOTES
ROS negative except as noted above today.    Social History     Tobacco Use    Smoking status: Never    Smokeless tobacco: Never   Substance Use Topics    Alcohol use: No    Drug use: No     Vitals:    06/18/25 1405   BP: 110/60   BP Site: Left Upper Arm   Patient Position: Sitting   Pulse: 70   SpO2: 98%   Weight: 92.5 kg (204 lb)   Height: 1.816 m (5' 11.5\")     Body mass index is 28.06 kg/m².  Physical Exam  Vitals reviewed.   Constitutional:       General: He is not in acute distress.     Appearance: He is not ill-appearing.   HENT:      Head: Normocephalic.   Eyes:      Extraocular Movements: Extraocular movements intact.   Cardiovascular:      Rate and Rhythm: Normal rate and regular rhythm.      Heart sounds: Normal heart sounds.   Pulmonary:      Effort: Pulmonary effort is normal.      Breath sounds: Normal breath sounds.   Abdominal:      General: Abdomen is flat.      Palpations: Abdomen is soft.      Tenderness: There is no abdominal tenderness.   Musculoskeletal:         General: Normal range of motion.      Cervical back: Neck supple.   Skin:     General: Skin is warm and dry.      Comments: R great toe with onychomycotic changes   Neurological:      General: No focal deficit present.      Mental Status: He is alert.           The patient (or guardian, if applicable) and other individuals in attendance with the patient were advised that Artificial Intelligence will be utilized during this visit to record, process the conversation to generate a clinical note, and support improvement of the AI technology. The patient (or guardian, if applicable) and other individuals in attendance at the appointment consented to the use of AI, including the recording.        An electronic signature was used to authenticate this note.  Erlin Stockton,

## 2025-06-20 DIAGNOSIS — Z12.11 COLON CANCER SCREENING: Primary | ICD-10-CM

## 2025-06-20 LAB
HEMOCCULT STL QL: NEGATIVE
VALID INTERNAL CONTROL: YES

## 2025-06-20 PROCEDURE — G0328 FECAL BLOOD SCRN IMMUNOASSAY: HCPCS | Performed by: FAMILY MEDICINE

## 2025-06-22 ENCOUNTER — RESULTS FOLLOW-UP (OUTPATIENT)
Dept: INTERNAL MEDICINE CLINIC | Facility: CLINIC | Age: 65
End: 2025-06-22

## 2025-06-24 ENCOUNTER — OFFICE VISIT (OUTPATIENT)
Dept: ORTHOPEDIC SURGERY | Age: 65
End: 2025-06-24
Payer: COMMERCIAL

## 2025-06-24 DIAGNOSIS — M54.12 CERVICAL RADICULOPATHY: ICD-10-CM

## 2025-06-24 DIAGNOSIS — M54.2 CERVICALGIA: Primary | ICD-10-CM

## 2025-06-24 PROCEDURE — 99213 OFFICE O/P EST LOW 20 MIN: CPT | Performed by: PHYSICAL MEDICINE & REHABILITATION

## 2025-06-24 NOTE — PROGRESS NOTES
Date:  06/24/25     HPI:  I am seeing Jaron Lopez in evalution/folowup.  I saw him most recently a year ago.  Full details in that note but basically has pain in the arms that comes and goes and the concerns that this is coming from the cervical spine.  This does respond to manipulating his cervical spine or with cervical traction.  He has had physical therapy for this.  We have tried to get a home traction unit but there has been some difficulty.    MRI cervical spine revealed moderate-severe neuroforaminal narrowing on the right at primarily C5-C6 level.    I do not get a history of myelopathic symptoms, weakness in lower extremities, etc.  He has tried medications, oral steroids as needed.  He has not required oral steroids in the last several years.    He does have some numbness in the arms from time to time again that could be from the cervical spine but also he has tried wrist splinting.    ROS: No new problems    PE:, Alert and cooperative.  Good strength upper extremities.  Ambulates without assistance.  No significant cervical spine tenderness.    Assessment/Plan:         Musculoskeletal cervical spine pain with radiculopathy.  Bilateral upper extremity radicular symptoms, typically a small area of involvement that responds to conservative measures.  Follow along.  We do not need to reimage.  I will try to make another effort to find a home traction unit preferably 1 of better quality.  No changes at this time.  Follow-up a year for continuity of care.  Can always reevaluate more aggressively if symptoms worsen.  If he needs a repeat steroid pack he will let us know.      Elements of this note/dictation were created using speech recognition software.  As a result, some errors of speech recognition may be noted throughout the narrative.    GERARDO HOOVER JR, MD